# Patient Record
Sex: FEMALE | Race: WHITE | Employment: FULL TIME | URBAN - METROPOLITAN AREA
[De-identification: names, ages, dates, MRNs, and addresses within clinical notes are randomized per-mention and may not be internally consistent; named-entity substitution may affect disease eponyms.]

---

## 2017-01-23 ENCOUNTER — OFFICE VISIT (OUTPATIENT)
Dept: FAMILY MEDICINE CLINIC | Facility: CLINIC | Age: 52
End: 2017-01-23

## 2017-01-23 VITALS
DIASTOLIC BLOOD PRESSURE: 82 MMHG | SYSTOLIC BLOOD PRESSURE: 122 MMHG | BODY MASS INDEX: 37 KG/M2 | TEMPERATURE: 98 F | WEIGHT: 196 LBS | RESPIRATION RATE: 16 BRPM | HEART RATE: 72 BPM | HEIGHT: 61 IN

## 2017-01-23 DIAGNOSIS — B37.3 VAGINAL YEAST INFECTION: ICD-10-CM

## 2017-01-23 DIAGNOSIS — H66.93 ACUTE BILATERAL OTITIS MEDIA: Primary | ICD-10-CM

## 2017-01-23 DIAGNOSIS — B96.89 ACUTE BACTERIAL PHARYNGITIS: ICD-10-CM

## 2017-01-23 DIAGNOSIS — J01.00 ACUTE MAXILLARY SINUSITIS, RECURRENCE NOT SPECIFIED: ICD-10-CM

## 2017-01-23 DIAGNOSIS — J45.20 CHRONIC ASTHMA, MILD INTERMITTENT, UNCOMPLICATED: ICD-10-CM

## 2017-01-23 DIAGNOSIS — J02.8 ACUTE BACTERIAL PHARYNGITIS: ICD-10-CM

## 2017-01-23 PROCEDURE — 99213 OFFICE O/P EST LOW 20 MIN: CPT | Performed by: FAMILY MEDICINE

## 2017-01-23 RX ORDER — FLUCONAZOLE 150 MG/1
150 TABLET ORAL ONCE
Qty: 1 TABLET | Refills: 0 | Status: SHIPPED | OUTPATIENT
Start: 2017-01-23 | End: 2017-01-23

## 2017-01-23 RX ORDER — CIPROFLOXACIN 500 MG/1
500 TABLET, FILM COATED ORAL 2 TIMES DAILY
Qty: 20 TABLET | Refills: 0 | Status: SHIPPED | OUTPATIENT
Start: 2017-01-23 | End: 2017-02-02

## 2017-01-23 RX ORDER — BUDESONIDE AND FORMOTEROL FUMARATE DIHYDRATE 160; 4.5 UG/1; UG/1
2 AEROSOL RESPIRATORY (INHALATION) 2 TIMES DAILY
Qty: 6 INHALER | Refills: 0 | COMMUNITY
Start: 2017-01-23 | End: 2017-04-23

## 2017-01-23 NOTE — PATIENT INSTRUCTIONS
Norina Lombard you were seen for acute bilateral ear infections and a sinus and throat infection. Use the ear drops three drops twice a day for ten days. Start the Cipro 500mg twice a day as well for also ten days.  Use the Diflucan if needed for yeast.   See me if drain better. And this helps prevent infection. Ask your doctor about these suggestions:  · Use a humidifier. Clean it often to remove any mold or mildew. · Drink several glasses of water a day.   · Stay away from drying beverages such as alcohol and coffe mucus to check for bacteria. If you have sinusitis that keeps coming back, you may need imaging tests such as X-rays or CAT scans. This will help your doctor check for a structural problem that may be causing the infection.   Treating acute sinusitis  Treat under 25years of age who has a fever. It may cause severe illness or death. Follow-up care  Follow up with your healthcare provider, or as advised, in 2 weeks if all symptoms have not gotten better, or if hearing doesn't go back to normal within 1 month.

## 2017-01-23 NOTE — PROGRESS NOTES
Liz Coffey is here for an acute visit and states she is sick again just like in December--bilateral ear pains and sinus pressure and pain in face --cheeks under eyes; sneezing a lot, coughing; mold allergies as well and raining unseasonably lately; no N/V/D/C s Stress reaction    • Allergic reaction    • Xerosis of skin    • Elevated BP    • Sciatica    • Gluteal pain      left   • Paresthesia    • Vaginal candidiasis    • BV (bacterial vaginosis)    • Cervical polyp    • Lymphadenopathy    • Cellulitis of scalp bruits  LUNGS: clear to auscultation currently with no wheezes or rhonchi   CARDIO: RRR without murmur  GI: good BS's,no masses, HSM or tenderness  EXTREMITIES: no cyanosis, clubbing or edema    ASSESSMENT AND PLAN:   Dai Mackenzie was seen for acute bilateral ear

## 2017-02-10 ENCOUNTER — TELEPHONE (OUTPATIENT)
Dept: FAMILY MEDICINE CLINIC | Facility: CLINIC | Age: 52
End: 2017-02-10

## 2017-02-10 DIAGNOSIS — H65.03 BILATERAL ACUTE SEROUS OTITIS MEDIA, RECURRENCE NOT SPECIFIED: ICD-10-CM

## 2017-02-10 DIAGNOSIS — J03.00 ACUTE NON-RECURRENT STREPTOCOCCAL TONSILLITIS: ICD-10-CM

## 2017-02-10 DIAGNOSIS — B96.89 ACUTE BACTERIAL PHARYNGITIS: ICD-10-CM

## 2017-02-10 DIAGNOSIS — J02.8 ACUTE BACTERIAL PHARYNGITIS: ICD-10-CM

## 2017-02-10 DIAGNOSIS — H66.93 ACUTE BILATERAL OTITIS MEDIA: Primary | ICD-10-CM

## 2017-02-13 RX ORDER — AZITHROMYCIN 250 MG/1
TABLET, FILM COATED ORAL
Qty: 6 TABLET | Refills: 0 | Status: SHIPPED | OUTPATIENT
Start: 2017-02-13 | End: 2017-03-03

## 2017-02-13 NOTE — TELEPHONE ENCOUNTER
Patient finished her Cipro and ear drops and now her symptoms have returned. She is asking for you to refill her antibiotic. Do you want to see patient or will you refill antibiotic and ear drop?

## 2017-02-13 NOTE — TELEPHONE ENCOUNTER
Call pt that her ear drops Neomycin based and her Zpack were re-issued and have her see me in two weeks after finishing the next round. ENT also an option to offer her as well.  Dr. Shayy Carlson

## 2017-02-28 DIAGNOSIS — Z51.81 ENCOUNTER FOR THERAPEUTIC DRUG MONITORING: ICD-10-CM

## 2017-02-28 DIAGNOSIS — J45.20 CHRONIC ASTHMA, MILD INTERMITTENT, UNCOMPLICATED: Primary | ICD-10-CM

## 2017-02-28 DIAGNOSIS — F32.9 REACTIVE DEPRESSION: ICD-10-CM

## 2017-03-01 RX ORDER — SERTRALINE HYDROCHLORIDE 100 MG/1
TABLET, FILM COATED ORAL
Qty: 30 TABLET | Refills: 0 | Status: SHIPPED | OUTPATIENT
Start: 2017-03-01 | End: 2017-04-03

## 2017-03-01 RX ORDER — MONTELUKAST SODIUM 10 MG/1
TABLET ORAL
Qty: 30 TABLET | Refills: 0 | Status: SHIPPED | OUTPATIENT
Start: 2017-03-01 | End: 2017-04-03

## 2017-03-01 RX ORDER — LEVOTHYROXINE SODIUM 0.07 MG/1
TABLET ORAL
Qty: 90 TABLET | Refills: 0 | OUTPATIENT
Start: 2017-03-01

## 2017-03-01 NOTE — TELEPHONE ENCOUNTER
Patient is requesting refills on Montelukast 10 mg # 90 and Sertraline 100 mg # 80  LOV for DX of medication refill - 4/4/16  No future appointments.

## 2017-03-03 ENCOUNTER — OFFICE VISIT (OUTPATIENT)
Dept: FAMILY MEDICINE CLINIC | Facility: CLINIC | Age: 52
End: 2017-03-03

## 2017-03-03 VITALS
OXYGEN SATURATION: 99 % | HEIGHT: 61 IN | SYSTOLIC BLOOD PRESSURE: 118 MMHG | WEIGHT: 198 LBS | BODY MASS INDEX: 37.38 KG/M2 | HEART RATE: 70 BPM | DIASTOLIC BLOOD PRESSURE: 68 MMHG | RESPIRATION RATE: 12 BRPM

## 2017-03-03 DIAGNOSIS — E03.9 ACQUIRED HYPOTHYROIDISM: Primary | ICD-10-CM

## 2017-03-03 DIAGNOSIS — J45.20 CHRONIC ASTHMA, MILD INTERMITTENT, UNCOMPLICATED: ICD-10-CM

## 2017-03-03 PROCEDURE — 99214 OFFICE O/P EST MOD 30 MIN: CPT | Performed by: FAMILY MEDICINE

## 2017-03-04 NOTE — PROGRESS NOTES
HPI:    Patient ID: Tee Vasquez is a 46year old female. HPI  Patient is here to get form filled out so she can attend a function of [employer] for her daughter. They require medical clearance.   She has history of asthma which is well con murmur, gallop, or rub  Respiratory:  Bilaterally clear to auscultation, no chest tenderness to palpation, no wheezing, no rhonchi, no rales, air entry is adequate, no accessory respiratory muscle use, no chest asymmetry, normal excursion.   GI:  bowel soun

## 2017-04-03 DIAGNOSIS — Z51.81 ENCOUNTER FOR THERAPEUTIC DRUG MONITORING: Primary | ICD-10-CM

## 2017-04-03 DIAGNOSIS — F32.9 REACTIVE DEPRESSION: ICD-10-CM

## 2017-04-05 RX ORDER — MONTELUKAST SODIUM 10 MG/1
TABLET ORAL
Qty: 30 TABLET | Refills: 2 | Status: SHIPPED | OUTPATIENT
Start: 2017-04-05 | End: 2017-07-14

## 2017-04-05 RX ORDER — SERTRALINE HYDROCHLORIDE 100 MG/1
TABLET, FILM COATED ORAL
Qty: 30 TABLET | Refills: 0 | Status: SHIPPED | OUTPATIENT
Start: 2017-04-05 | End: 2017-05-08

## 2017-04-05 NOTE — TELEPHONE ENCOUNTER
Requesting SERTRALINE HCL 100MG TAB  LOV: 3/3/2017 w/   Filled: 3/1/2017 #30 with 0 refills    No future appointments.

## 2017-04-09 DIAGNOSIS — E03.9 ACQUIRED HYPOTHYROIDISM: Primary | ICD-10-CM

## 2017-04-09 DIAGNOSIS — Z76.0 MEDICATION REFILL: ICD-10-CM

## 2017-04-10 RX ORDER — LEVOTHYROXINE SODIUM 0.07 MG/1
TABLET ORAL
Qty: 90 TABLET | Refills: 1 | Status: SHIPPED | OUTPATIENT
Start: 2017-04-10 | End: 2017-11-01

## 2017-04-10 NOTE — TELEPHONE ENCOUNTER
RequestingLEVOTHYROXIN 75MCG TAB   LOV: 3/3/2017  Filled: 11/10/16#90 with 0 refills    No future appointments.     Thyroid Supplements Protocol Failed4/9 3:13 PM   TSH test in past 12 months    TSH value between 0.350 and 5.500 IU/ml     Medication pended

## 2017-05-08 DIAGNOSIS — F32.9 REACTIVE DEPRESSION: ICD-10-CM

## 2017-05-08 DIAGNOSIS — Z51.81 ENCOUNTER FOR THERAPEUTIC DRUG MONITORING: Primary | ICD-10-CM

## 2017-05-08 RX ORDER — SERTRALINE HYDROCHLORIDE 100 MG/1
TABLET, FILM COATED ORAL
Qty: 90 TABLET | Refills: 1 | Status: SHIPPED | OUTPATIENT
Start: 2017-05-08 | End: 2017-11-01

## 2017-05-08 NOTE — TELEPHONE ENCOUNTER
Requesting SERTRALINE 100MG TAB  LOV: 3/3/2017  Filled: 4/5/2017#30 with 0 refills    No future appointments. Medication pended if okay to refill.

## 2017-07-14 DIAGNOSIS — Z51.81 ENCOUNTER FOR THERAPEUTIC DRUG MONITORING: ICD-10-CM

## 2017-07-14 DIAGNOSIS — F32.9 REACTIVE DEPRESSION: ICD-10-CM

## 2017-07-17 RX ORDER — MONTELUKAST SODIUM 10 MG/1
TABLET ORAL
Qty: 30 TABLET | Refills: 2 | Status: SHIPPED | OUTPATIENT
Start: 2017-07-17 | End: 2017-11-01

## 2017-07-17 NOTE — TELEPHONE ENCOUNTER
MONTELUKAST 10MG TAB  In chart as: MONTELUKAST SODIUM 10 MG Oral Tab  TAKE ONE TABLET BY MOUTH ONCE DAILY IN THE EVENING       Disp: 30 tablet Refills: 0    Class: Normal Start: 7/14/2017   For: Encounter for therapeutic drug monitoring, Reactive depressio

## 2017-10-28 DIAGNOSIS — Z76.0 MEDICATION REFILL: ICD-10-CM

## 2017-10-28 DIAGNOSIS — F32.9 REACTIVE DEPRESSION: ICD-10-CM

## 2017-10-28 DIAGNOSIS — E03.9 ACQUIRED HYPOTHYROIDISM: ICD-10-CM

## 2017-10-28 DIAGNOSIS — Z51.81 ENCOUNTER FOR THERAPEUTIC DRUG MONITORING: ICD-10-CM

## 2017-10-28 RX ORDER — MONTELUKAST SODIUM 10 MG/1
TABLET ORAL
Qty: 30 TABLET | Refills: 2 | Status: CANCELLED | OUTPATIENT
Start: 2017-10-28

## 2017-10-28 RX ORDER — LEVOTHYROXINE SODIUM 0.07 MG/1
TABLET ORAL
Qty: 90 TABLET | Refills: 1 | Status: CANCELLED | OUTPATIENT
Start: 2017-10-28

## 2017-10-30 NOTE — TELEPHONE ENCOUNTER
Future Appointments  Date Time Provider Staci Ellis   11/1/2017 2:00 PM Lee Mejia,  [facility] [address] [facility] [address]

## 2017-10-30 NOTE — TELEPHONE ENCOUNTER
Requested Medications   MONTELUKAST 10MG TAB  Will file in chart as: MONTELUKAST SODIUM 10 MG Oral Tab  TAKE ONE TABLET BY MOUTH ONCE DAILY IN THE EVENING       Disp: 30 tablet Refills: 2    Class: Normal Start: 10/28/2017   For: Encounter for therapeutic

## 2017-11-01 ENCOUNTER — OFFICE VISIT (OUTPATIENT)
Dept: FAMILY MEDICINE CLINIC | Facility: CLINIC | Age: 52
End: 2017-11-01

## 2017-11-01 VITALS
BODY MASS INDEX: 36.25 KG/M2 | HEIGHT: 61 IN | DIASTOLIC BLOOD PRESSURE: 76 MMHG | RESPIRATION RATE: 16 BRPM | WEIGHT: 192 LBS | HEART RATE: 82 BPM | OXYGEN SATURATION: 99 % | TEMPERATURE: 99 F | SYSTOLIC BLOOD PRESSURE: 122 MMHG

## 2017-11-01 DIAGNOSIS — B37.3 VAGINAL CANDIDIASIS: ICD-10-CM

## 2017-11-01 DIAGNOSIS — J45.20 MILD INTERMITTENT CHRONIC ASTHMA WITHOUT COMPLICATION: ICD-10-CM

## 2017-11-01 DIAGNOSIS — Z51.81 ENCOUNTER FOR THERAPEUTIC DRUG MONITORING: ICD-10-CM

## 2017-11-01 DIAGNOSIS — F32.9 REACTIVE DEPRESSION: ICD-10-CM

## 2017-11-01 DIAGNOSIS — E03.9 ACQUIRED HYPOTHYROIDISM: Primary | ICD-10-CM

## 2017-11-01 PROCEDURE — 99214 OFFICE O/P EST MOD 30 MIN: CPT | Performed by: FAMILY MEDICINE

## 2017-11-01 RX ORDER — MONTELUKAST SODIUM 10 MG/1
TABLET ORAL
Qty: 90 TABLET | Refills: 1 | Status: SHIPPED | OUTPATIENT
Start: 2017-11-01 | End: 2018-06-22

## 2017-11-01 RX ORDER — LEVOTHYROXINE SODIUM 0.07 MG/1
TABLET ORAL
Qty: 90 TABLET | Refills: 1 | Status: SHIPPED | OUTPATIENT
Start: 2017-11-01 | End: 2018-06-22

## 2017-11-01 RX ORDER — BUDESONIDE AND FORMOTEROL FUMARATE DIHYDRATE 160; 4.5 UG/1; UG/1
AEROSOL RESPIRATORY (INHALATION)
COMMUNITY
Start: 2017-09-07 | End: 2018-01-23

## 2017-11-01 RX ORDER — SERTRALINE HYDROCHLORIDE 100 MG/1
TABLET, FILM COATED ORAL
Qty: 90 TABLET | Refills: 1 | Status: SHIPPED | OUTPATIENT
Start: 2017-11-01 | End: 2018-06-22

## 2017-11-01 RX ORDER — FLUCONAZOLE 150 MG/1
150 TABLET ORAL ONCE
Qty: 1 TABLET | Refills: 0 | Status: SHIPPED | OUTPATIENT
Start: 2017-11-01 | End: 2017-11-01

## 2017-11-01 NOTE — PATIENT INSTRUCTIONS
Controlling Your Asthma  You can do a lot to manage your asthma and improve your quality of life. You will need to work with your healthcare provider to develop a plan. But it’s up to you to put this plan into action.   Why you need to take control  You n You have hypothyroidism. This means your thyroid gland is not making enough thyroid hormone. This hormone is vital to body growth and metabolism. If you don’t make enough, many body processes slow down. This can cause symptoms throughout the body.  Hypothyr · Tell your provider if you have any side effects from your medicines that bother you.   · Never change the dosage or stop taking your thyroid pills without talking to your provider first.  General care  · Always talk with your provider before trying other · Manage early symptoms. If you notice symptoms returning, experience triggers, or identify other factors that may lead to a depressive episode, get help as soon as possible.  Ask trusted friends and family to monitor your behavior and let you know if they · Get relief from stress. Ask your healthcare provider for relaxation exercises and techniques to help relieve stress. · Eat right. A balanced and healthy diet helps keep your body healthy.   Date Last Reviewed: 1/1/2017  © 5903-8190 [employer],

## 2017-11-01 NOTE — PROGRESS NOTES
Katya Vargas is a 46year old female. HPI:   Pt here for hypothyroidism, mild intermittent controlled asthma, depression, and medication management. Mom  day before son's wedding recently and stressed per pt.   Also struggling with dad who has Artem Heads • Depression    • Dermatitis    • Disc herniation     Left L5-S1   • Dyslipidemia    • Dyslipidemia    • Elevated BP    • Globus sensation    • Gluteal pain     left   • Hand, foot and mouth disease    • Hypercholesterolemia    • Hypocalcemia    • Hypoth suspicious lesions  HEENT: atraumatic, normocephalic,ears and throat are clear  NECK: supple,no adenopathy,no bruits  LUNGS: clear to auscultation  CARDIO: RRR without murmur  GI: good BS's,no masses, HSM or tenderness  EXTREMITIES: no cyanosis, clubbing o Oral Tab; TAKE ONE TABLET BY MOUTH ONCE DAILY IN THE EVENING  Pt and I discussed asthma action plan and she knows how to use medications/ inhalers for management and when to use 911 if needed. ACT score reviewed as well.     Pt to remain on Symbicort as wel

## 2017-11-02 ENCOUNTER — TELEPHONE (OUTPATIENT)
Dept: FAMILY MEDICINE CLINIC | Facility: CLINIC | Age: 52
End: 2017-11-02

## 2017-11-02 NOTE — PROGRESS NOTES
Pt seen yesterday and please call pt with normal results of thyroid studies and repeat as directed again in 6 mos and remain on current meds--  Dr. Carlos Sacha

## 2017-11-03 DIAGNOSIS — E03.9 ACQUIRED HYPOTHYROIDISM: Primary | ICD-10-CM

## 2018-01-10 DIAGNOSIS — J45.20 CHRONIC ASTHMA, MILD INTERMITTENT, UNCOMPLICATED: ICD-10-CM

## 2018-01-10 DIAGNOSIS — Z76.0 MEDICATION REFILL: Primary | ICD-10-CM

## 2018-01-10 RX ORDER — BUDESONIDE AND FORMOTEROL FUMARATE DIHYDRATE 160; 4.5 UG/1; UG/1
AEROSOL RESPIRATORY (INHALATION)
Qty: 3 INHALER | Refills: 3 | Status: SHIPPED | OUTPATIENT
Start: 2018-01-10 | End: 2019-01-05

## 2018-01-10 NOTE — TELEPHONE ENCOUNTER
SYMBICORT 160-4.5 AER  Will file in chart as: SYMBICORT 160-4.5 MCG/ACT Inhalation Aerosol  INHALE TWO PUFFS BY MOUTH TWICE DAILY       Disp: Not specified (Pharmacy requested 11 Undefined)   Refills: 3     Class: Normal Start: 1/10/2018   For: Chronic ast

## 2018-01-23 ENCOUNTER — OFFICE VISIT (OUTPATIENT)
Dept: FAMILY MEDICINE CLINIC | Facility: CLINIC | Age: 53
End: 2018-01-23

## 2018-01-23 ENCOUNTER — TELEPHONE (OUTPATIENT)
Dept: FAMILY MEDICINE CLINIC | Facility: CLINIC | Age: 53
End: 2018-01-23

## 2018-01-23 VITALS
HEART RATE: 85 BPM | SYSTOLIC BLOOD PRESSURE: 164 MMHG | OXYGEN SATURATION: 97 % | HEIGHT: 61 IN | BODY MASS INDEX: 36.46 KG/M2 | TEMPERATURE: 99 F | RESPIRATION RATE: 18 BRPM | DIASTOLIC BLOOD PRESSURE: 90 MMHG | WEIGHT: 193.13 LBS

## 2018-01-23 DIAGNOSIS — R03.0 ELEVATED BLOOD PRESSURE READING: ICD-10-CM

## 2018-01-23 DIAGNOSIS — R68.89 FLU-LIKE SYMPTOMS: Primary | ICD-10-CM

## 2018-01-23 PROCEDURE — 99214 OFFICE O/P EST MOD 30 MIN: CPT | Performed by: FAMILY MEDICINE

## 2018-01-23 PROCEDURE — 87798 DETECT AGENT NOS DNA AMP: CPT | Performed by: FAMILY MEDICINE

## 2018-01-23 PROCEDURE — 87502 INFLUENZA DNA AMP PROBE: CPT | Performed by: FAMILY MEDICINE

## 2018-01-23 RX ORDER — AZITHROMYCIN 250 MG/1
TABLET, FILM COATED ORAL
Qty: 6 TABLET | Refills: 0 | Status: SHIPPED | OUTPATIENT
Start: 2018-01-23 | End: 2018-06-22

## 2018-01-23 RX ORDER — PREDNISONE 20 MG/1
20 TABLET ORAL DAILY
Qty: 5 TABLET | Refills: 0 | Status: SHIPPED | OUTPATIENT
Start: 2018-01-23 | End: 2018-01-28

## 2018-01-23 NOTE — TELEPHONE ENCOUNTER
Patient felt like she had the flu on Saturday - stayed in bed all weekend, hydrated. She felt a little better, but now  she now has SOB - with asthma, temp of 100.   I advised we would need to check her breathing  Scheduled appointment for today to be [facility]

## 2018-01-23 NOTE — PATIENT INSTRUCTIONS
Flu swab pending. Continue supportive care with tylenol and mucinex. Also continue symbicort daily and albuterol as needed. Stop any medications with decongestant (sudafed or phenylephrine) due to elevated blood pressure.   If negative, can start Dorothe Giovanni (a coughing, sore throat, and congestion in your nose and sinuses. Don’t use a decongestant if you have high blood pressure. · Stay home until your fever has been gone for at least 24 hours without using medicine to reduce fever.   Follow-up care  Follow up w

## 2018-01-23 NOTE — TELEPHONE ENCOUNTER
Patient states that on Saturday she had bodyaches, fatigue, and fever. Patient states subsided and then today came back with fever, body aches, and heaviness in chest-pt is asthmatic.

## 2018-01-23 NOTE — PROGRESS NOTES
HPI:   Tereza Yeh is a 46year old female that presents for cough, sinus congestion, fever and body aches for 3 days. She notes she has been very fatigued and achy with fever since Saturday.   She has a history of moderate persistent asthma usually on Rfl:     REVIEW OF SYSTEMS:     Comprehensive ROS negative unless noted in HPI    PHYSICAL EXAM:   BP (!) 164/90   Pulse 85   Temp 99.4 °F (37.4 °C) (Temporal)   Resp 18   Ht 61\"   Wt 193 lb 2 oz   SpO2 97%   BMI 36.49 kg/m²  Estimated body mass index is medications consistently for a few days. Likely due to decongestant use. Recommend patient follow-up in 1 week for annual physical and blood pressure check    Risks, benefits, and alternatives of current treatment plan discussed in detail.   Questions and

## 2018-01-24 ENCOUNTER — TELEPHONE (OUTPATIENT)
Dept: FAMILY MEDICINE CLINIC | Facility: CLINIC | Age: 53
End: 2018-01-24

## 2018-01-24 NOTE — TELEPHONE ENCOUNTER
Patient states that she will call back on Monday to provide us with her work fax number.     Letter placed in brown folder in triage

## 2018-01-24 NOTE — TELEPHONE ENCOUNTER
Dr. Melody Lay did a letter for patient to be off work. It was not given at office visit? I left message for patient to verify if we are faxing this for her?

## 2018-02-12 ENCOUNTER — PRIOR ORIGINAL RECORDS (OUTPATIENT)
Dept: OTHER | Age: 53
End: 2018-02-12

## 2018-02-20 ENCOUNTER — MYAURORA ACCOUNT LINK (OUTPATIENT)
Dept: OTHER | Age: 53
End: 2018-02-20

## 2018-02-20 ENCOUNTER — HOSPITAL ENCOUNTER (OUTPATIENT)
Dept: CV DIAGNOSTICS | Age: 53
Discharge: HOME | End: 2018-02-20
Attending: INTERNAL MEDICINE
Payer: COMMERCIAL

## 2018-02-20 DIAGNOSIS — R00.2 PALPITATIONS: ICD-10-CM

## 2018-02-20 DIAGNOSIS — I34.1 MITRAL VALVE PROLAPSE: ICD-10-CM

## 2018-02-21 ENCOUNTER — PRIOR ORIGINAL RECORDS (OUTPATIENT)
Dept: OTHER | Age: 53
End: 2018-02-21

## 2018-03-05 ENCOUNTER — MYAURORA ACCOUNT LINK (OUTPATIENT)
Dept: OTHER | Age: 53
End: 2018-03-05

## 2018-03-05 ENCOUNTER — PRIOR ORIGINAL RECORDS (OUTPATIENT)
Dept: OTHER | Age: 53
End: 2018-03-05

## 2018-03-05 ENCOUNTER — MED REC SCAN ONLY (OUTPATIENT)
Dept: FAMILY MEDICINE CLINIC | Facility: CLINIC | Age: 53
End: 2018-03-05

## 2018-03-06 ENCOUNTER — PRIOR ORIGINAL RECORDS (OUTPATIENT)
Dept: OTHER | Age: 53
End: 2018-03-06

## 2018-03-20 ENCOUNTER — PRIOR ORIGINAL RECORDS (OUTPATIENT)
Dept: OTHER | Age: 53
End: 2018-03-20

## 2018-06-22 ENCOUNTER — OFFICE VISIT (OUTPATIENT)
Dept: FAMILY MEDICINE CLINIC | Facility: CLINIC | Age: 53
End: 2018-06-22

## 2018-06-22 VITALS
DIASTOLIC BLOOD PRESSURE: 82 MMHG | SYSTOLIC BLOOD PRESSURE: 130 MMHG | WEIGHT: 201 LBS | RESPIRATION RATE: 16 BRPM | BODY MASS INDEX: 37.95 KG/M2 | HEIGHT: 61 IN | HEART RATE: 78 BPM

## 2018-06-22 DIAGNOSIS — Z51.81 ENCOUNTER FOR THERAPEUTIC DRUG MONITORING: Primary | ICD-10-CM

## 2018-06-22 DIAGNOSIS — J45.20 MILD INTERMITTENT CHRONIC ASTHMA WITHOUT COMPLICATION: ICD-10-CM

## 2018-06-22 DIAGNOSIS — H02.403 PTOSIS OF BOTH EYELIDS: ICD-10-CM

## 2018-06-22 DIAGNOSIS — R53.83 FATIGUE, UNSPECIFIED TYPE: ICD-10-CM

## 2018-06-22 DIAGNOSIS — E03.9 ACQUIRED HYPOTHYROIDISM: ICD-10-CM

## 2018-06-22 DIAGNOSIS — E66.09 CLASS 2 OBESITY DUE TO EXCESS CALORIES WITHOUT SERIOUS COMORBIDITY WITH BODY MASS INDEX (BMI) OF 37.0 TO 37.9 IN ADULT: ICD-10-CM

## 2018-06-22 DIAGNOSIS — Z01.89 ROUTINE LAB DRAW: ICD-10-CM

## 2018-06-22 DIAGNOSIS — Z30.432 ENCOUNTER FOR IUD REMOVAL: ICD-10-CM

## 2018-06-22 DIAGNOSIS — F32.9 REACTIVE DEPRESSION: ICD-10-CM

## 2018-06-22 PROCEDURE — 99214 OFFICE O/P EST MOD 30 MIN: CPT | Performed by: PHYSICIAN ASSISTANT

## 2018-06-22 RX ORDER — SERTRALINE HYDROCHLORIDE 100 MG/1
TABLET, FILM COATED ORAL
Qty: 90 TABLET | Refills: 1 | Status: SHIPPED | OUTPATIENT
Start: 2018-06-22 | End: 2019-01-15

## 2018-06-22 RX ORDER — MONTELUKAST SODIUM 10 MG/1
TABLET ORAL
Qty: 90 TABLET | Refills: 1 | Status: SHIPPED | OUTPATIENT
Start: 2018-06-22 | End: 2019-01-15

## 2018-06-22 RX ORDER — LEVOTHYROXINE SODIUM 0.07 MG/1
TABLET ORAL
Qty: 90 TABLET | Refills: 1 | Status: SHIPPED | OUTPATIENT
Start: 2018-06-22 | End: 2019-01-15

## 2018-06-22 RX ORDER — BUPROPION HYDROCHLORIDE 150 MG/1
150 TABLET ORAL DAILY
Qty: 30 TABLET | Refills: 0 | Status: SHIPPED | OUTPATIENT
Start: 2018-06-22 | End: 2019-01-15

## 2018-06-22 NOTE — PROGRESS NOTES
[facility] Internal Medicine Progress Note    CC:  Patient presents with:  Medication Request  Referral: natty ferrari IUD removed      HPI:   HPI  Hypothyroidism  Pt is doing well on levothyroxine  She denies any hair loss  She does complain of w and headaches. Psychiatric/Behavioral: Positive for dysphoric mood. Negative for self-injury, sleep disturbance and suicidal ideas. The patient is not nervous/anxious.           /82   Pulse 78   Resp 16   Ht 61\"   Wt 201 lb   BMI 37.98 kg/m²  Body labs  Will check sleep study    Routine lab draw    RTC in 1 month, sooner if problems    Orders Placed This Encounter      CBC      CMP      Lipid Panel      TSH [899] [Q]      T4 FREE [866] [Q]      HGB A1C [496] [Q]      VITAMIN D, 25-HYDROXY [46758][Q]

## 2018-06-22 NOTE — PATIENT INSTRUCTIONS
Thank you for choosing Farhan Arenas PA-C at [place] [address]  To Do: Aixa Grace  1. Pt to get lab work done  2. Download myfitnesspal  3. Start wellbutrin  4.  Pt to follow-up in 1 month  Your weight:  Wt Readings from Last 6 Encounters:  06/2 plate with fruits, vegetables, nuts, whole grains, and meat of every kind except processed meat like salami, bradford, bologna. Low calorie is less than 1773-9702 calories a day.   Patients need to follow a diet that fits with their lifestyle, you have to eat focus on good carbs  BAD carbs have added sugar such as  - soft drinks   - sport drinks   - fruit drinks   - beer   - french fries   - white rice   - sugar-sweetened cereals   - bagels   - baguettes   - croissants   - potato chips   - cookies   - white cra know that our intention is that the benefits outweigh those potential risks and we strive to make you healthier and to improve your quality of life. Referrals, Radiology, and Lab Testing:     If your insurance requires a referral to a specialist, please

## 2018-11-23 ENCOUNTER — OFFICE VISIT (OUTPATIENT)
Dept: FAMILY MEDICINE CLINIC | Facility: CLINIC | Age: 53
End: 2018-11-23
Payer: COMMERCIAL

## 2018-11-23 VITALS
WEIGHT: 200 LBS | OXYGEN SATURATION: 98 % | DIASTOLIC BLOOD PRESSURE: 80 MMHG | TEMPERATURE: 99 F | HEIGHT: 61 IN | BODY MASS INDEX: 37.76 KG/M2 | RESPIRATION RATE: 18 BRPM | SYSTOLIC BLOOD PRESSURE: 122 MMHG | HEART RATE: 81 BPM

## 2018-11-23 DIAGNOSIS — J01.00 ACUTE NON-RECURRENT MAXILLARY SINUSITIS: Primary | ICD-10-CM

## 2018-11-23 PROCEDURE — 99213 OFFICE O/P EST LOW 20 MIN: CPT | Performed by: NURSE PRACTITIONER

## 2018-11-23 RX ORDER — DOXYCYCLINE HYCLATE 100 MG/1
100 CAPSULE ORAL 2 TIMES DAILY
Qty: 14 CAPSULE | Refills: 0 | Status: SHIPPED | OUTPATIENT
Start: 2018-11-23 | End: 2018-11-30

## 2018-11-23 NOTE — PROGRESS NOTES
CHIEF COMPLAINT:   Patient presents with:  Sinus Problem: s/s for 10 days  Ear Pain: right ear  Cough: wet. OTC meds taken      HPI:   Noah Flower is a 48year old female who presents for sinus congestion for  10  days.  Symptoms have been worsening • Cervical polyp    • Chronic asthma    • Contact dermatitis    • Cough     acute   • DDD (degenerative disc disease), lumbar    • Depression    • Dermatitis    • Disc herniation     Left L5-S1   • Dyslipidemia    • Dyslipidemia    • Elevated BP    • Globu /80   Pulse 81   Temp 98.6 °F (37 °C) (Oral)   Resp 18   Ht 61\"   Wt 200 lb   SpO2 98%   Breastfeeding?  No   BMI 37.79 kg/m²   GENERAL: well developed, well nourished,in no apparent distress  SKIN: no rashes,no suspicious lesions  HEAD: atraumatic, To make sure you're using it properly-- look at the floor, insert the nozzle into the nasal opening and aim the spray toward the ceiling. Use a gentle sniff when you spray the flonase into the nostril.  Avoid using a big \"sniff\" which will send the spr · You can use an over-the-counter decongestant, unless a similar medicine was prescribed to you. Nasal sprays work the fastest. Use one that contains phenylephrine or oxymetazoline. First blow your nose gently. Then use the spray.  Do not use these medicine · Don’t have close contact with people who have sore throats, colds, or other upper respiratory infections. · Don’t smoke, and stay away from secondhand smoke. · Stay up to date with of your vaccines.   Date Last Reviewed: 11/1/2017  © 4838-6795 [employer]

## 2018-11-23 NOTE — PATIENT INSTRUCTIONS
-continue mucinex  -can take allergy med vs sudafed  -stay well hydrated and rest  -follow up if you develop a fever, worsening in symptoms or no improvement in 3-4 days    -Use flonase-- 2 sprays each nostril at bedtime.   To make sure you're using it prop · You can use an over-the-counter decongestant, unless a similar medicine was prescribed to you. Nasal sprays work the fastest. Use one that contains phenylephrine or oxymetazoline. First blow your nose gently. Then use the spray.  Do not use these medicine · Don’t have close contact with people who have sore throats, colds, or other upper respiratory infections. · Don’t smoke, and stay away from secondhand smoke. · Stay up to date with of your vaccines.   Date Last Reviewed: 11/1/2017  © 1859-2738 [employer]

## 2018-12-01 ENCOUNTER — APPOINTMENT (OUTPATIENT)
Dept: GENERAL RADIOLOGY | Age: 53
End: 2018-12-01
Attending: PHYSICIAN ASSISTANT
Payer: COMMERCIAL

## 2018-12-01 ENCOUNTER — HOSPITAL ENCOUNTER (OUTPATIENT)
Age: 53
Discharge: HOME | End: 2018-12-01
Payer: COMMERCIAL

## 2018-12-01 VITALS
OXYGEN SATURATION: 96 % | DIASTOLIC BLOOD PRESSURE: 76 MMHG | TEMPERATURE: 98 F | HEART RATE: 77 BPM | RESPIRATION RATE: 16 BRPM | SYSTOLIC BLOOD PRESSURE: 144 MMHG

## 2018-12-01 DIAGNOSIS — H65.01 RIGHT ACUTE SEROUS OTITIS MEDIA, RECURRENCE NOT SPECIFIED: ICD-10-CM

## 2018-12-01 DIAGNOSIS — J40 BRONCHITIS: Primary | ICD-10-CM

## 2018-12-01 PROCEDURE — 99213 OFFICE O/P EST LOW 20 MIN: CPT

## 2018-12-01 PROCEDURE — 99214 OFFICE O/P EST MOD 30 MIN: CPT

## 2018-12-01 PROCEDURE — 71046 X-RAY EXAM CHEST 2 VIEWS: CPT | Performed by: PHYSICIAN ASSISTANT

## 2018-12-01 RX ORDER — BENZONATATE 100 MG/1
CAPSULE ORAL 3 TIMES DAILY PRN
Qty: 30 CAPSULE | Refills: 0 | Status: SHIPPED | OUTPATIENT
Start: 2018-12-01 | End: 2018-12-31

## 2018-12-01 RX ORDER — PREDNISONE 20 MG/1
40 TABLET ORAL DAILY
Qty: 10 TABLET | Refills: 0 | Status: SHIPPED | OUTPATIENT
Start: 2018-12-01 | End: 2018-12-06

## 2018-12-01 NOTE — ED PROVIDER NOTES
Patient Seen in: [address] [facility]    History   Patient presents with:  Cough/URI    Stated Complaint: Cough, Ear pain R ear x 3 weeks, Fatigue    HPI    CHIEF COMPLAINT: Cough for the past 3 weeks, right ear pressure     HISTORY OF [place] Depression    • Dermatitis    • Disc herniation     Left L5-S1   • Dyslipidemia    • Dyslipidemia    • Elevated BP    • Globus sensation    • Gluteal pain     left   • Hand, foot and mouth disease    • Hypercholesterolemia    • Hypocalcemia    • Hypothyroi or injection, no sclera icterus  Ears: Bilateral canals clear. Left TM clear. Right TM with serous otitis. No injection or erythema is noted. Throat: There is no erythema or exudates, no tonsillar hypertrophy. no trismus or stridor. Uvula midline.  No comes in with complaints of ongoing cough for the past 3 weeks. Patient did complete a round of antibiotics without any improvement. She is not worsening, but is concerned because she has continued cough.   Chest x-ray was performed today and was negative

## 2018-12-01 NOTE — ED INITIAL ASSESSMENT (HPI)
C/O cough and congestion that started 3 weeks ago. Sts that she has been seen for this before and given an antibiotic. She is not any better. Cough is productive w/ green phlegm.

## 2019-01-11 DIAGNOSIS — F32.9 REACTIVE DEPRESSION: ICD-10-CM

## 2019-01-11 DIAGNOSIS — Z51.81 ENCOUNTER FOR THERAPEUTIC DRUG MONITORING: ICD-10-CM

## 2019-01-14 RX ORDER — SERTRALINE HYDROCHLORIDE 100 MG/1
TABLET, FILM COATED ORAL
Qty: 90 TABLET | Refills: 1 | OUTPATIENT
Start: 2019-01-14

## 2019-01-14 NOTE — TELEPHONE ENCOUNTER
Name from pharmacy: SERTRALINE 100MG TAB          Will file in chart as: SERTRALINE  MG Oral Tab    Sig: TAKE 1 TABLET BY MOUTH ONCE DAILY    Disp:  90 tablet    Refills:  1    Start: 1/11/2019    Class: Normal    For: Encounter for therapeutic drug

## 2019-01-15 ENCOUNTER — LAB ENCOUNTER (OUTPATIENT)
Dept: LAB | Age: 54
End: 2019-01-15
Attending: PHYSICIAN ASSISTANT
Payer: COMMERCIAL

## 2019-01-15 ENCOUNTER — OFFICE VISIT (OUTPATIENT)
Dept: FAMILY MEDICINE CLINIC | Facility: CLINIC | Age: 54
End: 2019-01-15
Payer: COMMERCIAL

## 2019-01-15 VITALS
RESPIRATION RATE: 16 BRPM | WEIGHT: 202 LBS | HEIGHT: 61 IN | SYSTOLIC BLOOD PRESSURE: 130 MMHG | DIASTOLIC BLOOD PRESSURE: 78 MMHG | HEART RATE: 76 BPM | BODY MASS INDEX: 38.14 KG/M2

## 2019-01-15 DIAGNOSIS — E66.09 CLASS 2 OBESITY DUE TO EXCESS CALORIES WITHOUT SERIOUS COMORBIDITY WITH BODY MASS INDEX (BMI) OF 38.0 TO 38.9 IN ADULT: ICD-10-CM

## 2019-01-15 DIAGNOSIS — F32.A DEPRESSION, UNSPECIFIED DEPRESSION TYPE: ICD-10-CM

## 2019-01-15 DIAGNOSIS — Z01.89 ROUTINE LAB DRAW: ICD-10-CM

## 2019-01-15 DIAGNOSIS — J45.20 MILD INTERMITTENT CHRONIC ASTHMA WITHOUT COMPLICATION: Primary | ICD-10-CM

## 2019-01-15 DIAGNOSIS — E03.9 HYPOTHYROIDISM, UNSPECIFIED TYPE: ICD-10-CM

## 2019-01-15 DIAGNOSIS — Z12.11 SCREENING FOR COLON CANCER: ICD-10-CM

## 2019-01-15 DIAGNOSIS — M76.62 TENDONITIS, ACHILLES, LEFT: ICD-10-CM

## 2019-01-15 LAB
T4 FREE SERPL-MCNC: 1.2 NG/DL (ref 0.9–1.8)
TSI SER-ACNC: 0.94 MIU/ML (ref 0.35–5.5)

## 2019-01-15 PROCEDURE — 99214 OFFICE O/P EST MOD 30 MIN: CPT | Performed by: PHYSICIAN ASSISTANT

## 2019-01-15 PROCEDURE — 84443 ASSAY THYROID STIM HORMONE: CPT

## 2019-01-15 PROCEDURE — 36415 COLL VENOUS BLD VENIPUNCTURE: CPT

## 2019-01-15 PROCEDURE — 84439 ASSAY OF FREE THYROXINE: CPT

## 2019-01-15 RX ORDER — LEVOTHYROXINE SODIUM 0.07 MG/1
TABLET ORAL
Qty: 90 TABLET | Refills: 1 | Status: SHIPPED | OUTPATIENT
Start: 2019-01-15 | End: 2019-10-09

## 2019-01-15 RX ORDER — MONTELUKAST SODIUM 10 MG/1
TABLET ORAL
Qty: 90 TABLET | Refills: 1 | Status: SHIPPED | OUTPATIENT
Start: 2019-01-15 | End: 2019-10-09

## 2019-01-15 RX ORDER — BUDESONIDE AND FORMOTEROL FUMARATE DIHYDRATE 160; 4.5 UG/1; UG/1
160 AEROSOL RESPIRATORY (INHALATION) DAILY
COMMUNITY
End: 2019-03-20

## 2019-01-15 NOTE — PATIENT INSTRUCTIONS
Thank you for choosing Noemy Villanueva PA-C at [address]  To Do: Katya Vargas  1. Pt to get labs done  2. Continue medications  3. Referral for colonoscopy, WLC, ortho  4.  Follow-up in 6 months, sooner if problems  • Please signup for [facility] that the insurance company approved your testing, please call Central Scheduling at [phone]  Please allow our office 5 business days to contact you regarding any testing results.     Refill policies:   Allow 3 business days for refills; controlled subs

## 2019-01-15 NOTE — PROGRESS NOTES
[facility] Internal Medicine Progress Note    CC:  Patient presents with:  Medication Request      HPI:   HPI  Hypothyroidism  Pt is doing well on levothyroxine  Denies any weight gain, hair loss, brittle nails    Depression  Pt is doing well on for nausea and vomiting. Musculoskeletal: Positive for myalgias. Bump L ankle   Neurological: Negative for dizziness, light-headedness and headaches.    Psychiatric/Behavioral: Negative for dysphoric mood, self-injury, sleep disturbance and suicida that is ok and to just call the office    Class 2 obesity due to excess calories without serious comorbidity with body mass index (bmi) of 38.0 to 38.9 in adult  Pt to continue to work on clean eating  Increase exercise  Referral for [facility]    Screening for c

## 2019-02-28 VITALS
HEART RATE: 70 BPM | DIASTOLIC BLOOD PRESSURE: 70 MMHG | BODY MASS INDEX: 37.19 KG/M2 | HEIGHT: 61 IN | WEIGHT: 197 LBS | SYSTOLIC BLOOD PRESSURE: 126 MMHG

## 2019-02-28 VITALS
HEART RATE: 70 BPM | WEIGHT: 196 LBS | BODY MASS INDEX: 37 KG/M2 | SYSTOLIC BLOOD PRESSURE: 142 MMHG | HEIGHT: 61 IN | DIASTOLIC BLOOD PRESSURE: 80 MMHG

## 2019-03-19 NOTE — TELEPHONE ENCOUNTER
Recd refill request from [facility] in [address] for Symbicort 160-4.5 AER. Unable to escribe due to original prescriber.

## 2019-03-20 ENCOUNTER — TELEPHONE (OUTPATIENT)
Dept: FAMILY MEDICINE CLINIC | Facility: CLINIC | Age: 54
End: 2019-03-20

## 2019-03-20 RX ORDER — BUDESONIDE AND FORMOTEROL FUMARATE DIHYDRATE 160; 4.5 UG/1; UG/1
2 AEROSOL RESPIRATORY (INHALATION) DAILY
Qty: 3 INHALER | Refills: 1 | Status: SHIPPED | OUTPATIENT
Start: 2019-03-20 | End: 2021-01-29

## 2019-03-20 NOTE — TELEPHONE ENCOUNTER
[address], [address] [facility] [phone], [phone]   Medication Detail      Disp Refills Start End    Budesonide-Formoterol Fumarate (SYMBICORT) 160-4.5 MCG/ACT Inhalation Aerosol 3 Inhaler 1 3/20/2019     Sig - Rout

## 2019-03-20 NOTE — TELEPHONE ENCOUNTER
Asthma & COPD Medication Protocol Passed  Requesting SYMBICORT 160-4.5 MCG/ACT Inhalation Aerosol  LOV: 1/15/19  RTC: 6 mos  Last Labs: AAP 1/15/19  Filled: 1/10/18#3 with 3 refills    No future appointments.

## 2019-03-20 NOTE — TELEPHONE ENCOUNTER
[facility] calling to clarify Symbicort directions. Patient has been on Symbicort 2 puffs twice daily and it was sent for 2 puffs once daily. [facility] pharmacy was informed to keep same directions as before.

## 2019-05-03 ENCOUNTER — TELEPHONE (OUTPATIENT)
Dept: FAMILY MEDICINE CLINIC | Facility: CLINIC | Age: 54
End: 2019-05-03

## 2019-05-03 NOTE — TELEPHONE ENCOUNTER
Patient overdue for annual physical and several preventative screenings. Please contact to schedule.      Thierry Chakraborty, DO  Family Medicine

## 2019-06-13 NOTE — PATIENT INSTRUCTIONS
Thank you for choosing Nishi Evans PA-C at [place] [address]  To Do: Tee Vasquez  1. Pt to discontinue zoloft, and begin prozac, if after 2 weeks symptoms not doing better, can increase to 2 tabs daily. 2. Xanax as needed  3.  Referral for col up to 10 business days to receive approval from your insurance company.      Once our office has called informing you that the insurance company approved your testing, please call Central Scheduling at [phone]  Please allow our office 5 business days t

## 2019-06-13 NOTE — PROGRESS NOTES
[facility] Internal Medicine Progress Note    CC:  Patient presents with: Anxiety: c/o having increase in anxiety, here to discuss changing the zoloft  & possibly adding xanax. Lump: left heel that is causing her pain.       HPI:   HPI  Pt is cu Review of Systems   Constitutional: Negative for chills, fatigue and fever. Respiratory: Negative for cough and shortness of breath. Cardiovascular: Negative for chest pain. Gastrointestinal: Negative for nausea and vomiting.    Musculoskeletal: Posi Will also begin xanax as needed  Discussed side effects and adverse effects of medication    Achilles tendinitis of left lower extremity  Referral for ortho    Personal history of colonic polyps  Screening for colon cancer  Referral for gastro    Screening

## 2019-07-11 ENCOUNTER — TELEPHONE (OUTPATIENT)
Dept: FAMILY MEDICINE CLINIC | Facility: CLINIC | Age: 54
End: 2019-07-11

## 2019-07-11 RX ORDER — SERTRALINE HYDROCHLORIDE 100 MG/1
100 TABLET, FILM COATED ORAL DAILY
Qty: 90 TABLET | Refills: 0 | Status: SHIPPED | OUTPATIENT
Start: 2019-07-11 | End: 2019-10-09

## 2019-07-11 NOTE — TELEPHONE ENCOUNTER
No problem, will refill zoloft 100 mg for 90 days. Pt is overdue for physical, pap, mammogram and colon cancer screening and labs. Please have her schedule appt for annual physical before next refill and complete labs BEFORE appt.       Jaden Davis
Pt states that she upps her Zoloft from 75mg to 100 mg when she is having difficulty. Pt has been staying on 100 mg and is now out of medication- she wants it refilled asap.
Requesting Zoloft 50 mg (takes 75 mg) - pt increased her med to 100mg.  Requesting RX for 100mg  LOV: 6/13/19  Anxiety and depression  (primary encounter diagnosis)  Uncontrolled  Will discontinue zoloft and begin prozac  Discussed may have to increase afte
See results below. Spoke with patient regarding below. Patient is aware she needs to fast before having labs completed. Patient aware, all questions answered.  Patient verbalized understanding
No significant past surgical history

## 2019-10-09 NOTE — PATIENT INSTRUCTIONS
Thank you for choosing Le Jones PA-C at [place] [address]  To Do: Long [place]  1. Patient to get lab work done  2. Continue medications as prescribed  3.   Follow-up around 6 months, sooner if problems    • Please signup for MY CHART, omar insurance company approved your testing, please call Central Scheduling at [phone]  Please allow our office 5 business days to contact you regarding any testing results.     Refill policies:   Allow 3 business days for refills; controlled substances ma

## 2019-10-09 NOTE — PROGRESS NOTES
[facility] Internal Medicine Progress Note    CC:  Patient presents with:  Medication Follow-Up: refills  Cough: x 2 weeks  Sinus Problem      HPI:   HPI  About 1.5 weeks had a terrible productive cough, but was able to get over  It has now come Respiratory: Positive for cough. Negative for chest tightness, shortness of breath and wheezing. Cardiovascular: Negative for chest pain. Gastrointestinal: Negative for nausea and vomiting.    Neurological: Negative for dizziness, light-headedness an specified  Patient begin medications as prescribed, discussed side effects and adverse effects of medication    Will request records from her GYN for cervical cancer screening and mammogram  Patient encouraged to make appointment with gastro for colonoscop right breast     Fibrocystic breast changes     Duct ectasia of breast

## 2019-10-14 ENCOUNTER — TELEPHONE (OUTPATIENT)
Dept: FAMILY MEDICINE CLINIC | Facility: CLINIC | Age: 54
End: 2019-10-14

## 2019-10-14 NOTE — TELEPHONE ENCOUNTER
Received pap results via fax from Johnathan Davis at [facility]. Health maintenance updated. Results abstracted in Ext. Results console. Results placed in Megan's bin for review, then send to scan.

## 2019-12-09 ENCOUNTER — OFFICE VISIT (OUTPATIENT)
Dept: FAMILY MEDICINE CLINIC | Facility: CLINIC | Age: 54
End: 2019-12-09
Payer: COMMERCIAL

## 2019-12-09 VITALS
HEART RATE: 75 BPM | DIASTOLIC BLOOD PRESSURE: 90 MMHG | HEIGHT: 61 IN | OXYGEN SATURATION: 99 % | SYSTOLIC BLOOD PRESSURE: 140 MMHG | BODY MASS INDEX: 37.72 KG/M2 | RESPIRATION RATE: 18 BRPM | WEIGHT: 199.81 LBS | TEMPERATURE: 99 F

## 2019-12-09 DIAGNOSIS — J01.00 ACUTE MAXILLARY SINUSITIS, RECURRENCE NOT SPECIFIED: Primary | ICD-10-CM

## 2019-12-09 PROCEDURE — 99213 OFFICE O/P EST LOW 20 MIN: CPT | Performed by: NURSE PRACTITIONER

## 2019-12-09 RX ORDER — DOXYCYCLINE HYCLATE 100 MG/1
100 CAPSULE ORAL 2 TIMES DAILY
Qty: 14 CAPSULE | Refills: 0 | Status: SHIPPED | OUTPATIENT
Start: 2019-12-09 | End: 2019-12-16

## 2019-12-09 NOTE — PROGRESS NOTES
HPI:   Remington Rollins is a 47year old female who presents with ill symptoms for  1 1/2  weeks. Patient reports congestion, ear pain, sinus pain, OTC cold meds have not been helping. Has tried Advil cold and sinus with not much relief.  Pain is more right • Lipid screening 12/21/10   • Low back pain     axial   • Lymphadenopathy    • Macrocytic anemia    • MVP (mitral valve prolapse)    • Nasal turbinate hypertrophy    • OM (otitis media), acute     b/l   • Otalgia     left   • Otitis externa     right an frontal and maxillary right sided sinus tenderness with palpation. NECK: supple,no adenopathy  LUNGS: clear to auscultation  CARDIO: RRR without murmur    ASSESSMENT AND PLAN:    PLAN:Rosario was seen today for ear problem.     Diagnoses and all orders for t

## 2019-12-09 NOTE — PATIENT INSTRUCTIONS
·  PLAN: Doxycycline, take as directed. Finish all the medication even if you feel better. Avoid sun or tanning during use. · Salt water gargles (1 tsp. Salt in 6 oz lukewarm water), use several times daily to help remove post nasal drip.   · May stop cold

## 2019-12-18 ENCOUNTER — TELEPHONE (OUTPATIENT)
Dept: FAMILY MEDICINE CLINIC | Facility: CLINIC | Age: 54
End: 2019-12-18

## 2019-12-18 NOTE — TELEPHONE ENCOUNTER
Pt needs a script for her sinus infection. She says the following doesn't seem to work for her.  No amoxicillin & no [facility] [address], [address] [facility] [phone], [phone]

## 2019-12-19 NOTE — TELEPHONE ENCOUNTER
Attempted to reach pt, VM \"not set up\". See 12/9/19 [facility] notes, NP advised follow up with Dr. Alec Blackman if symptoms continue.    10/9/19 last OV Eunice Ripa Anxiety and Depression/Asthma/SINUSITIS/Hypothyroidism and was given Bactrim/Medrol Pack

## 2019-12-20 ENCOUNTER — OFFICE VISIT (OUTPATIENT)
Dept: FAMILY MEDICINE CLINIC | Facility: CLINIC | Age: 54
End: 2019-12-20
Payer: COMMERCIAL

## 2019-12-20 VITALS
BODY MASS INDEX: 37.57 KG/M2 | RESPIRATION RATE: 18 BRPM | HEIGHT: 61 IN | OXYGEN SATURATION: 98 % | SYSTOLIC BLOOD PRESSURE: 140 MMHG | TEMPERATURE: 98 F | DIASTOLIC BLOOD PRESSURE: 82 MMHG | HEART RATE: 68 BPM | WEIGHT: 199 LBS

## 2019-12-20 DIAGNOSIS — J01.90 ACUTE SINUSITIS TREATED WITH ANTIBIOTICS IN THE PAST 60 DAYS: ICD-10-CM

## 2019-12-20 DIAGNOSIS — J01.41 ACUTE RECURRENT PANSINUSITIS: Primary | ICD-10-CM

## 2019-12-20 PROCEDURE — 99213 OFFICE O/P EST LOW 20 MIN: CPT | Performed by: NURSE PRACTITIONER

## 2019-12-20 RX ORDER — PREDNISONE 20 MG/1
20 TABLET ORAL 2 TIMES DAILY
Qty: 10 TABLET | Refills: 0 | Status: SHIPPED | OUTPATIENT
Start: 2019-12-20 | End: 2019-12-25

## 2019-12-20 RX ORDER — SULFAMETHOXAZOLE AND TRIMETHOPRIM 800; 160 MG/1; MG/1
1 TABLET ORAL 2 TIMES DAILY
Qty: 20 TABLET | Refills: 0 | Status: SHIPPED | OUTPATIENT
Start: 2019-12-20 | End: 2019-12-30

## 2019-12-20 NOTE — PROGRESS NOTES
CHIEF COMPLAINT:   Patient presents with:  Sinus Problem: s/s return after finishing ABX  Ear Problem: right ear pressure. OTC meds taken      HPI:   Sebastian Villegas is a 47year old female who presents for cold symptoms for  2-3  weeks.  Symptoms improved • Loratadine (CLARITIN) 10 MG Oral Cap Take  by mouth.         Past Medical History:   Diagnosis Date   • Acute maxillary sinusitis    • Acute pharyngitis    • Acute URI    • Allergic reaction    • Allergic rhinitis    • Anxiety disorder    • BV (bacterial SKIN: no rashes or abnormal skin lesions  HEENT: See HPI. LUNGS: denies shortness of breath or wheezing, See HPI  CARDIOVASCULAR: denies chest pain or palpitations   GI: denies N/V/C or abdominal pain  NEURO: + sinus headaches.   No numbness or tingling Risks, benefits, side effects of medication addressed and explained. Patient Instructions     Sinusitis (Antibiotic Treatment)    The sinuses are air-filled spaces within the bones of the face.  They connect to the inside of the nose. Sinusitis is an inf · Over-the-counter antihistamines may help if allergies contributed to your sinusitis.    · Do not use nasal rinses or irrigation during an acute sinus infection, unless your healthcare provider tells you to.  Rinsing may spread the infection to other areas

## 2020-01-16 ENCOUNTER — OFFICE VISIT (OUTPATIENT)
Dept: FAMILY MEDICINE CLINIC | Facility: CLINIC | Age: 55
End: 2020-01-16
Payer: COMMERCIAL

## 2020-01-16 VITALS
OXYGEN SATURATION: 99 % | DIASTOLIC BLOOD PRESSURE: 84 MMHG | TEMPERATURE: 98 F | WEIGHT: 201 LBS | HEART RATE: 80 BPM | RESPIRATION RATE: 16 BRPM | SYSTOLIC BLOOD PRESSURE: 118 MMHG | HEIGHT: 61 IN | BODY MASS INDEX: 37.95 KG/M2

## 2020-01-16 DIAGNOSIS — E03.9 HYPOTHYROIDISM, UNSPECIFIED TYPE: ICD-10-CM

## 2020-01-16 DIAGNOSIS — J45.20 CHRONIC ASTHMA, MILD INTERMITTENT, UNCOMPLICATED: ICD-10-CM

## 2020-01-16 DIAGNOSIS — L23.9 ALLERGIC DERMATITIS: Primary | ICD-10-CM

## 2020-01-16 DIAGNOSIS — J45.20 MILD INTERMITTENT CHRONIC ASTHMA WITHOUT COMPLICATION: ICD-10-CM

## 2020-01-16 DIAGNOSIS — L98.9 SCALP LESION: ICD-10-CM

## 2020-01-16 DIAGNOSIS — Z79.899 ENCOUNTER FOR LONG-TERM (CURRENT) USE OF OTHER MEDICATIONS: ICD-10-CM

## 2020-01-16 PROCEDURE — 99214 OFFICE O/P EST MOD 30 MIN: CPT | Performed by: FAMILY MEDICINE

## 2020-01-16 RX ORDER — MONTELUKAST SODIUM 10 MG/1
TABLET ORAL
Qty: 90 TABLET | Refills: 1 | Status: SHIPPED | OUTPATIENT
Start: 2020-01-16 | End: 2020-09-21

## 2020-01-16 RX ORDER — ALBUTEROL SULFATE 90 UG/1
2 AEROSOL, METERED RESPIRATORY (INHALATION) EVERY 6 HOURS PRN
Qty: 3 INHALER | Refills: 3 | Status: SHIPPED | OUTPATIENT
Start: 2020-01-16

## 2020-01-16 RX ORDER — LEVOTHYROXINE SODIUM 0.07 MG/1
TABLET ORAL
Qty: 90 TABLET | Refills: 1 | Status: SHIPPED | OUTPATIENT
Start: 2020-01-16 | End: 2020-09-21

## 2020-01-16 RX ORDER — METHYLPREDNISOLONE 4 MG/1
TABLET ORAL
Qty: 1 KIT | Refills: 1 | Status: SHIPPED | OUTPATIENT
Start: 2020-01-16 | End: 2020-03-06 | Stop reason: ALTCHOICE

## 2020-01-16 RX ORDER — SERTRALINE HYDROCHLORIDE 100 MG/1
100 TABLET, FILM COATED ORAL DAILY
Qty: 90 TABLET | Refills: 0 | Status: SHIPPED | OUTPATIENT
Start: 2020-01-16 | End: 2020-05-12

## 2020-01-16 NOTE — PROGRESS NOTES
HPI:    Patient ID: Ashly Vasquez is a 47year old female. HPI  Ms. Wisam Lamb is a pleasant 51-year-old female with depression and anxiety, allergies, asthma here today for skin rash involving her upper extremities and lower abdomen and back area.   Thes HFA) 108 (90 Base) MCG/ACT Inhalation Aero Soln Inhale 2 puffs into the lungs every 6 (six) hours as needed for Wheezing. 3 Inhaler 3   • methylPREDNISolone (MEDROL) 4 MG Oral Tablet Therapy Pack As directed.  1 kit 1   • ALPRAZolam (XANAX) 0.25 MG Oral Tab erythema but with no tenderness nor swelling or discharge. This was slightly raised. Vitals reviewed. ASSESSMENT/PLAN:   Allergic dermatitis  (primary encounter diagnosis)  -Likely due to wild rice; I told her that it is best to avoid this.

## 2020-03-06 ENCOUNTER — OFFICE VISIT (OUTPATIENT)
Dept: FAMILY MEDICINE CLINIC | Facility: CLINIC | Age: 55
End: 2020-03-06
Payer: COMMERCIAL

## 2020-03-06 VITALS
SYSTOLIC BLOOD PRESSURE: 126 MMHG | WEIGHT: 200 LBS | DIASTOLIC BLOOD PRESSURE: 80 MMHG | HEART RATE: 74 BPM | RESPIRATION RATE: 18 BRPM | OXYGEN SATURATION: 99 % | TEMPERATURE: 98 F | HEIGHT: 61 IN | BODY MASS INDEX: 37.76 KG/M2

## 2020-03-06 DIAGNOSIS — J45.41 MODERATE PERSISTENT ASTHMA WITH EXACERBATION: Primary | ICD-10-CM

## 2020-03-06 DIAGNOSIS — Z28.21 INFLUENZA VACCINATION DECLINED BY PATIENT: ICD-10-CM

## 2020-03-06 DIAGNOSIS — R68.89 FLU-LIKE SYMPTOMS: ICD-10-CM

## 2020-03-06 DIAGNOSIS — H66.001 NON-RECURRENT ACUTE SUPPURATIVE OTITIS MEDIA OF RIGHT EAR WITHOUT SPONTANEOUS RUPTURE OF TYMPANIC MEMBRANE: ICD-10-CM

## 2020-03-06 DIAGNOSIS — R05.8 CHESTY COUGH: ICD-10-CM

## 2020-03-06 PROCEDURE — 99213 OFFICE O/P EST LOW 20 MIN: CPT | Performed by: FAMILY MEDICINE

## 2020-03-06 RX ORDER — METHYLPREDNISOLONE 4 MG/1
TABLET ORAL
Qty: 1 KIT | Refills: 0 | Status: CANCELLED | OUTPATIENT
Start: 2020-03-06

## 2020-03-06 RX ORDER — AZITHROMYCIN 250 MG/1
TABLET, FILM COATED ORAL
Qty: 6 TABLET | Refills: 0 | Status: CANCELLED | OUTPATIENT
Start: 2020-03-06

## 2020-03-06 RX ORDER — PREDNISONE 20 MG/1
40 TABLET ORAL DAILY
Qty: 10 TABLET | Refills: 0 | Status: SHIPPED | OUTPATIENT
Start: 2020-03-06 | End: 2020-03-11

## 2020-03-06 RX ORDER — DOXYCYCLINE HYCLATE 100 MG/1
100 TABLET, DELAYED RELEASE ORAL 2 TIMES DAILY
Qty: 14 TABLET | Refills: 0 | Status: SHIPPED | OUTPATIENT
Start: 2020-03-06 | End: 2020-03-13

## 2020-03-06 NOTE — PROGRESS NOTES
Patient presents with:  Viral Syndrome: She states she had the flu on monday and she took off for the week and now she states that its moved to her chest and she has chest conegstion with bad cough and right ear pain      HPI:     Aixa Grace is a 47 y bruise/bleed easily. Psychiatric/Behavioral: The patient is not nervous/anxious. No depression.       Physical Exam:     Findings:    /80   Pulse 74   Temp 98.4 °F (36.9 °C) (Oral)   Resp 18   Ht 61\"   Wt 200 lb (90.7 kg)   SpO2 99%   BMI 37.79 kg/ otitis media of right ear without spontaneous rupture of tympanic membrane H66.001 Doxycycline Hyclate 100 MG Oral Tab EC     - Rx: doxycycline.  , suspect bacterial infection   - started on steroids for history of asthma.   - advised symptomatic tx: push f

## 2020-03-06 NOTE — PATIENT INSTRUCTIONS
Asthma (Adult)  Asthma is a disease where the medium and  small air passages within the lung go into spasm and restrict the flow of air. Inflammation and swelling of the airways cause further blockage.  During an acute asthma attack, these factors cause t A pneumococcal (pneumonia) vaccine and yearly flu shot (every fall) are recommended. Ask your doctor about this.   When to seek medical advice  Call your healthcare provider right away if any of these occur:   · Increased wheezing or shortness of breath  · · If your child goes to group , he or she runs a greater risk of getting colds or flu. This may then lead to an ear infection. Help prevent these illnesses by teaching your child to wash his or her hands often.   · If your child has nasal allergies,

## 2020-03-16 ENCOUNTER — OFFICE VISIT (OUTPATIENT)
Dept: FAMILY MEDICINE CLINIC | Facility: CLINIC | Age: 55
End: 2020-03-16
Payer: COMMERCIAL

## 2020-03-16 VITALS
HEART RATE: 74 BPM | WEIGHT: 203 LBS | TEMPERATURE: 99 F | HEIGHT: 61 IN | RESPIRATION RATE: 16 BRPM | DIASTOLIC BLOOD PRESSURE: 80 MMHG | SYSTOLIC BLOOD PRESSURE: 130 MMHG | OXYGEN SATURATION: 98 % | BODY MASS INDEX: 38.33 KG/M2

## 2020-03-16 DIAGNOSIS — Z00.00 LABORATORY EXAMINATION ORDERED AS PART OF A COMPLETE PHYSICAL EXAMINATION: ICD-10-CM

## 2020-03-16 DIAGNOSIS — Z12.11 SCREENING FOR COLON CANCER: ICD-10-CM

## 2020-03-16 DIAGNOSIS — H65.191 ACUTE EFFUSION OF RIGHT EAR: ICD-10-CM

## 2020-03-16 DIAGNOSIS — J40 BRONCHITIS: ICD-10-CM

## 2020-03-16 DIAGNOSIS — Z23 NEED FOR PNEUMOCOCCAL VACCINE: ICD-10-CM

## 2020-03-16 DIAGNOSIS — J45.40 MODERATE PERSISTENT ASTHMATIC BRONCHITIS WITHOUT COMPLICATION: ICD-10-CM

## 2020-03-16 DIAGNOSIS — J45.30 MILD PERSISTENT CHRONIC ASTHMA WITHOUT COMPLICATION: Primary | ICD-10-CM

## 2020-03-16 PROCEDURE — 90732 PPSV23 VACC 2 YRS+ SUBQ/IM: CPT | Performed by: FAMILY MEDICINE

## 2020-03-16 PROCEDURE — 90471 IMMUNIZATION ADMIN: CPT | Performed by: FAMILY MEDICINE

## 2020-03-16 PROCEDURE — 99213 OFFICE O/P EST LOW 20 MIN: CPT | Performed by: FAMILY MEDICINE

## 2020-03-16 RX ORDER — METHYLPREDNISOLONE 4 MG/1
TABLET ORAL
Qty: 1 KIT | Refills: 0 | Status: SHIPPED | OUTPATIENT
Start: 2020-03-16 | End: 2020-09-21 | Stop reason: ALTCHOICE

## 2020-03-16 RX ORDER — BUDESONIDE AND FORMOTEROL FUMARATE DIHYDRATE 160; 4.5 UG/1; UG/1
2 AEROSOL RESPIRATORY (INHALATION) 2 TIMES DAILY
COMMUNITY
End: 2020-09-21

## 2020-03-16 NOTE — PROGRESS NOTES
Patient presents with: Follow - Up: 2 week follow up- she states she is getting better but is still fatigue. She does feel winded when she walks. HPI:  Patient is here for a follow up.  She was seen on 3/6 for asthma exacerbation   She was treated wi Macrocytic anemia     Dyslipidemia     Depression     MVP (mitral valve prolapse)     DDD (degenerative disc disease), lumbar     Anxiety disorder     Diarrhea     Otitis media of both ears     Bloody discharge from right nipple     Papilloma of right matt Social History    Tobacco Use      Smoking status: Never Smoker      Smokeless tobacco: Never Used    Alcohol use: No      Comment: social    Drug use: No      Current Outpatient Medications   Medication Sig Dispense Refill   • methylPREDNISolone (MEDR 03/16/2020    Deferred                Date(s) Deferred    FLULAVAL 6 months & older 0.5 ml Prefilled syringe (80055)                          10/09/2019  03/06/2020        Physical Exam  /80   Pulse 74   Temp 98.5 °F (36.9 °C) (Temporal)   Resp 16 Budesonide-Formoterol Fumarate 160-4.5 MCG/ACT Inhalation Aerosol; Inhale 2 puffs into the lungs 2 (two) times daily. 4 SAMPLES GIVEN TO PATIENT    4. Bronchitis  -steroid pack as above.    Advised to f/u if symptoms are not improving in 3 days.   - Budeson

## 2020-03-18 NOTE — PATIENT INSTRUCTIONS
Viral or Bacterial Bronchitis with Wheezing (Adult)    Bronchitis is an infection of the air passages. It often occurs during a cold and is usually caused by a virus. Symptoms include cough with mucus (phlegm) and low-grade fever.  This illness is contagi · Over-the-counter cough, cold, and sore-throat medicines will not shorten the length of the illness, but they may be helpful to reduce symptoms.  (Note: Don't use decongestants if you have high blood pressure.)  · If you were given an inhaler, use it exact You have a viral bronchitis. Bronchitis is inflammation and swelling of the lining of the lungs. This is often caused by an infection. Symptoms include a dry, hacking cough that is worse at night. The cough may bring up yellow-green mucus.  You may also fee · Over-the-counter cough, cold, and sore-throat medicines will not shorten the length of the illness, but they may help to reduce symptoms. Don't use decongestants if you have high blood pressure.   Follow-up care  Follow up with your healthcare provider, o Experts are not exactly sure what causes asthma. It may be caused by a mix of inherited and environmental factors. People with asthma may have no symptoms until they are exposed to an allergen or trigger.   Healthy lungs  Inside your lungs there are branchi In some people, over time chronic mild inflammation can lead to lasting (permanent) scarring of airways and loss of lung function. Asthma flare-ups  When sensitive airways are irritated by a trigger, the muscles around the airways tighten.  The lining of t Take your medicine as prescribed. Also learn about your asthma triggers. Knowing what causes your asthma to flare up in the first place can help you prevent future breathing problems. If you smoke, get help to quit.   Date Last Reviewed: 3/1/2019  © 2000-2

## 2020-05-12 RX ORDER — SERTRALINE HYDROCHLORIDE 100 MG/1
TABLET, FILM COATED ORAL
Qty: 90 TABLET | Refills: 0 | Status: SHIPPED | OUTPATIENT
Start: 2020-05-12 | End: 2020-09-08

## 2020-05-12 NOTE — TELEPHONE ENCOUNTER
Requesting SERTRALINE  MG  LOV: 3/16/20   RTC: No follow-ups on file. Last Relevant Labs: lab orders pending   Filled: 1/16/20  # 90with 0 refills    No future appointments.

## 2020-05-26 ENCOUNTER — TELEPHONE (OUTPATIENT)
Dept: FAMILY MEDICINE CLINIC | Facility: CLINIC | Age: 55
End: 2020-05-26

## 2020-05-26 DIAGNOSIS — R42 VERTIGO: Primary | ICD-10-CM

## 2020-05-26 NOTE — TELEPHONE ENCOUNTER
Patient was seen in the [place] [address] last Wednesday for vertigo. Patient will be having ppw faxed over from specialist for therapy for the vertigo. Please call patieint.

## 2020-06-03 NOTE — TELEPHONE ENCOUNTER
I was waiting from fax from specialist. Have you received any notes?     Last visit 3/16/2020 - Dr. Jose J Luu for asthma

## 2020-06-08 NOTE — TELEPHONE ENCOUNTER
Patient called to follow up on the request.  She said she needs a referral to [address] [place] [address] before someone will see her to start any paperwork.   Patient spoke with Karl Goncalves at [address] [place] [address] regarding the need for a referral.  Kenn Nuno

## 2020-06-09 NOTE — TELEPHONE ENCOUNTER
[facility]   Cc: [facility] Referral Pool   Phone Number: [phone]             .Reason for the order/referral:VESTIBULAR DISORDER/CONSULT   PCP: Clari Gimenez   Refer to Provider Kayode Downing

## 2020-06-09 NOTE — TELEPHONE ENCOUNTER
Pt was seen at [facility] and is being referred for Vertigo. Pt would like to go to [facility] [employer]. Referral pended. Please advise.

## 2020-09-08 NOTE — TELEPHONE ENCOUNTER
Future Appointments   Date Time Provider Staci Ellis   9/21/2020  4:15 PM Lupe Adjutant, DO [facility] [address]     LOV: 3/16/2020 for acute. Last refill given on 5/12/2020 for #90.  -Medication pended for review. See message below.

## 2020-09-08 NOTE — TELEPHONE ENCOUNTER
SERTRALINE  MG Oral Tab    [facility] [address]  [facility] [phone], [phone]    Future Appointments   Date Time Provider Staci Ellis   9/21/2020  4:15 PM Ninfa Norton DO [facility] [address]     Arlyn Plascencia

## 2020-09-11 RX ORDER — SERTRALINE HYDROCHLORIDE 100 MG/1
100 TABLET, FILM COATED ORAL DAILY
Qty: 90 TABLET | Refills: 0 | Status: SHIPPED | OUTPATIENT
Start: 2020-09-11 | End: 2020-09-21

## 2020-09-21 ENCOUNTER — OFFICE VISIT (OUTPATIENT)
Dept: FAMILY MEDICINE CLINIC | Facility: CLINIC | Age: 55
End: 2020-09-21
Payer: COMMERCIAL

## 2020-09-21 VITALS
HEART RATE: 74 BPM | DIASTOLIC BLOOD PRESSURE: 80 MMHG | BODY MASS INDEX: 38.71 KG/M2 | HEIGHT: 61 IN | WEIGHT: 205 LBS | RESPIRATION RATE: 16 BRPM | TEMPERATURE: 99 F | SYSTOLIC BLOOD PRESSURE: 134 MMHG

## 2020-09-21 DIAGNOSIS — Z71.82 EXERCISE COUNSELING: ICD-10-CM

## 2020-09-21 DIAGNOSIS — Z79.899 ENCOUNTER FOR LONG-TERM (CURRENT) USE OF OTHER MEDICATIONS: ICD-10-CM

## 2020-09-21 DIAGNOSIS — Z12.31 ENCOUNTER FOR SCREENING MAMMOGRAM FOR MALIGNANT NEOPLASM OF BREAST: ICD-10-CM

## 2020-09-21 DIAGNOSIS — J45.30 MILD PERSISTENT CHRONIC ASTHMA WITHOUT COMPLICATION: ICD-10-CM

## 2020-09-21 DIAGNOSIS — F41.9 ANXIETY: ICD-10-CM

## 2020-09-21 DIAGNOSIS — Z12.11 SCREENING FOR COLON CANCER: ICD-10-CM

## 2020-09-21 DIAGNOSIS — Z13.31 DEPRESSION SCREENING: ICD-10-CM

## 2020-09-21 DIAGNOSIS — J45.20 CHRONIC ASTHMA, MILD INTERMITTENT, UNCOMPLICATED: ICD-10-CM

## 2020-09-21 DIAGNOSIS — J45.20 MILD INTERMITTENT CHRONIC ASTHMA WITHOUT COMPLICATION: ICD-10-CM

## 2020-09-21 DIAGNOSIS — R42 VERTIGO: ICD-10-CM

## 2020-09-21 DIAGNOSIS — E66.09 CLASS 2 OBESITY DUE TO EXCESS CALORIES WITHOUT SERIOUS COMORBIDITY WITH BODY MASS INDEX (BMI) OF 38.0 TO 38.9 IN ADULT: ICD-10-CM

## 2020-09-21 DIAGNOSIS — E03.9 HYPOTHYROIDISM, UNSPECIFIED TYPE: ICD-10-CM

## 2020-09-21 DIAGNOSIS — E78.5 DYSLIPIDEMIA: ICD-10-CM

## 2020-09-21 DIAGNOSIS — Z23 FLU VACCINE NEED: ICD-10-CM

## 2020-09-21 DIAGNOSIS — D24.1 PAPILLOMA OF RIGHT BREAST: ICD-10-CM

## 2020-09-21 DIAGNOSIS — Z00.00 WELL ADULT EXAM: Primary | ICD-10-CM

## 2020-09-21 PROBLEM — J45.909 ASTHMA: Status: ACTIVE | Noted: 2020-09-21

## 2020-09-21 PROBLEM — J45.909 ASTHMA (HCC): Status: ACTIVE | Noted: 2020-09-21

## 2020-09-21 PROCEDURE — 90471 IMMUNIZATION ADMIN: CPT | Performed by: FAMILY MEDICINE

## 2020-09-21 PROCEDURE — 3079F DIAST BP 80-89 MM HG: CPT | Performed by: FAMILY MEDICINE

## 2020-09-21 PROCEDURE — 90686 IIV4 VACC NO PRSV 0.5 ML IM: CPT | Performed by: FAMILY MEDICINE

## 2020-09-21 PROCEDURE — 99213 OFFICE O/P EST LOW 20 MIN: CPT | Performed by: FAMILY MEDICINE

## 2020-09-21 PROCEDURE — 3075F SYST BP GE 130 - 139MM HG: CPT | Performed by: FAMILY MEDICINE

## 2020-09-21 PROCEDURE — 99396 PREV VISIT EST AGE 40-64: CPT | Performed by: FAMILY MEDICINE

## 2020-09-21 PROCEDURE — 3008F BODY MASS INDEX DOCD: CPT | Performed by: FAMILY MEDICINE

## 2020-09-21 RX ORDER — LEVOTHYROXINE SODIUM 0.07 MG/1
TABLET ORAL
Qty: 90 TABLET | Refills: 1 | Status: SHIPPED | OUTPATIENT
Start: 2020-09-21 | End: 2021-05-20

## 2020-09-21 RX ORDER — MECLIZINE HYDROCHLORIDE 25 MG/1
25 TABLET ORAL 3 TIMES DAILY PRN
Qty: 90 TABLET | Refills: 0 | Status: SHIPPED | OUTPATIENT
Start: 2020-09-21 | End: 2020-12-20

## 2020-09-21 RX ORDER — MECLIZINE HYDROCHLORIDE 25 MG/1
25 TABLET ORAL 3 TIMES DAILY PRN
COMMUNITY
Start: 2020-05-21 | End: 2020-09-21

## 2020-09-21 RX ORDER — ALPRAZOLAM 0.25 MG/1
0.25 TABLET ORAL DAILY PRN
Qty: 30 TABLET | Refills: 0 | Status: SHIPPED | OUTPATIENT
Start: 2020-09-21

## 2020-09-21 RX ORDER — SERTRALINE HYDROCHLORIDE 100 MG/1
100 TABLET, FILM COATED ORAL DAILY
Qty: 90 TABLET | Refills: 0 | Status: SHIPPED | OUTPATIENT
Start: 2020-09-21 | End: 2021-05-20

## 2020-09-21 RX ORDER — MONTELUKAST SODIUM 10 MG/1
TABLET ORAL
Qty: 90 TABLET | Refills: 1 | Status: SHIPPED | OUTPATIENT
Start: 2020-09-21 | End: 2021-05-20

## 2020-09-21 NOTE — PROGRESS NOTES
Patient presents with:  Physical      HPI:  New patient who is here for her physical exam.    Anxiety: Patient reports that her anxiety is worsening due to the season change. She typically has anxiety that worsens during the spring and fall seasons.   She She has a history of vertigo for which she would like a referral to see her therapist for vestibular rehab. She has a history of BPPV.   currently feeling fine    Smoking: none  Alcohol: occasionally   Drugs: none   Sexual hx: 1 partner   STD hx: declined • Cellulitis of scalp    • Cervical polyp    • Chronic asthma    • Contact dermatitis    • Cough     acute   • DDD (degenerative disc disease), lumbar    • Depression    • Dermatitis    • Disc herniation     Left L5-S1   • Dyslipidemia    • Dyslipidemia • Montelukast Sodium 10 MG Oral Tab TAKE ONE TABLET BY MOUTH ONCE DAILY IN THE EVENING 90 tablet 1   • Sertraline HCl 100 MG Oral Tab Take 1 tablet (100 mg total) by mouth daily.  90 tablet 0   • Meclizine HCl 25 MG Oral Tab Take 1 tablet (25 mg total) by m Cardiovascular: Normal rate, regular rhythm and intact distal pulses. No murmur, rubs or gallops. Pulmonary/Chest: Effort normal and breath sounds normal. No respiratory distress. No wheezes, rhonchi or rales  Abdominal: Soft.  Bowel sounds are normal. N Patient will call if any symptoms worsen. Patient understands and agrees to the above plan. 5. Vertigo  - PHYSICAL THERAPY EXTERNAL  - Meclizine HCl 25 MG Oral Tab; Take 1 tablet (25 mg total) by mouth 3 (three) times daily as needed for Dizziness.   Marcelina Fajardo Requested Prescriptions     Signed Prescriptions Disp Refills   • ALPRAZolam (XANAX) 0.25 MG Oral Tab 30 tablet 0     Sig: Take 1 tablet (0.25 mg total) by mouth daily as needed for Anxiety.    • Levothyroxine Sodium 75 MCG Oral Tab 90 tablet 1     Sig: RAAD Type 2 diabetes or prediabetes All women diagnosed with gestational diabetes Lifelong testing every 3 years   Type 2 diabetes All women with prediabetes Every year   Alcohol misuse All women in this age group At routine exams   Blood pressure All women in Gonorrhea Sexually active women at increased risk for infection At routine exams   Hepatitis C Anyone at increased risk; 1 time for those born between [facility] At routine exams   High cholesterol or triglycerides All women in this age group who are at Meningococcal Women at increased risk for infection – talk with your healthcare provider 1 or more doses   Pneumococcal conjugate vaccine (PCV13) and pneumococcal polysaccharide vaccine (PPSV23) Women at increased risk for infection – talk with your health

## 2020-12-09 ENCOUNTER — TELEPHONE (OUTPATIENT)
Dept: FAMILY MEDICINE CLINIC | Facility: CLINIC | Age: 55
End: 2020-12-09

## 2020-12-10 NOTE — TELEPHONE ENCOUNTER
Spoke with pt to inform her we do not have any Symbicort samples in the office at this time, offered pt \"Zero-Pay\" discount card from [employer]. Pt accepted and asked if that can be mail, confirmed I will mail that to pt's home.

## 2020-12-11 ENCOUNTER — LAB ENCOUNTER (OUTPATIENT)
Dept: LAB | Facility: HOSPITAL | Age: 55
End: 2020-12-11
Attending: INTERNAL MEDICINE
Payer: COMMERCIAL

## 2020-12-11 DIAGNOSIS — Z01.818 PRE-OP TESTING: ICD-10-CM

## 2020-12-14 PROBLEM — Z86.0101 HISTORY OF ADENOMATOUS POLYP OF COLON: Status: ACTIVE | Noted: 2020-12-14

## 2020-12-14 PROBLEM — Z86.010 HISTORY OF ADENOMATOUS POLYP OF COLON: Status: ACTIVE | Noted: 2020-12-14

## 2021-01-29 ENCOUNTER — TELEPHONE (OUTPATIENT)
Dept: FAMILY MEDICINE CLINIC | Facility: CLINIC | Age: 56
End: 2021-01-29

## 2021-01-29 RX ORDER — BUDESONIDE AND FORMOTEROL FUMARATE DIHYDRATE 160; 4.5 UG/1; UG/1
2 AEROSOL RESPIRATORY (INHALATION) DAILY
Qty: 3 INHALER | Refills: 1 | Status: SHIPPED | OUTPATIENT
Start: 2021-01-29 | End: 2021-01-30

## 2021-01-29 NOTE — TELEPHONE ENCOUNTER
Requested Prescriptions     Budesonide-Formoterol Fumarate (SYMBICORT) 160-4.5 MCG/ACT Inhalation Aerosol         Sig: Inhale 2 puffs into the lungs daily.     Disp:  3 Inhaler    Refills:  1    Start: 1/29/2021    Class: Normal    Non-formulary    Last ord

## 2021-01-29 NOTE — TELEPHONE ENCOUNTER
Pt will need prescription for the Symbicort sent to the local [facility] pharmacy. Pt states that she has been getting samples from office-this is why it has been a while since she has had it filled.

## 2021-01-29 NOTE — TELEPHONE ENCOUNTER
Budesonide-Formoterol Fumarate (SYMBICORT) 160-4.5 MCG/ACT Inhalation Aerosol 3 Inhaler 1 1/29/2021    Sig:   Inhale 2 puffs into the lungs daily. Pharmacy is calling to get clarification.

## 2021-02-01 RX ORDER — BUDESONIDE AND FORMOTEROL FUMARATE DIHYDRATE 160; 4.5 UG/1; UG/1
2 AEROSOL RESPIRATORY (INHALATION) 2 TIMES DAILY
Qty: 3 INHALER | Refills: 1 | Status: SHIPPED | OUTPATIENT
Start: 2021-02-01 | End: 2021-02-03

## 2021-02-01 NOTE — TELEPHONE ENCOUNTER
See Bimal, should Symbicort be BID? Last OV 9/21/2020. From OV 3/16/2020:  3. Mild persistent chronic asthma without complication  Will treat with medrol dosepack. Continue with symbicort. Samples given. F/u if symptoms are not improved.    Jim Varner

## 2021-02-01 NOTE — TELEPHONE ENCOUNTER
Routing comment:    Likely BID, but patient has not seen my in 3 years so I don't know her regimen.  I think she sees Dr. Jennifer Soler. Veda Rey send to her and update pcp if correct. [facility] signed under my name probably because it passed protocol.

## 2021-02-03 RX ORDER — BUDESONIDE AND FORMOTEROL FUMARATE DIHYDRATE 160; 4.5 UG/1; UG/1
2 AEROSOL RESPIRATORY (INHALATION) 2 TIMES DAILY
Qty: 3 INHALER | Refills: 1 | Status: SHIPPED | OUTPATIENT
Start: 2021-02-03

## 2021-02-03 NOTE — TELEPHONE ENCOUNTER
Received PA response from [facility] and states generic Symbicort has been denied based under the patients plan. They state the preferred alternative is BRAND Symbicort.    -medication pended if ok to send brand name.

## 2021-02-11 ENCOUNTER — TELEPHONE (OUTPATIENT)
Dept: FAMILY MEDICINE CLINIC | Facility: CLINIC | Age: 56
End: 2021-02-11

## 2021-02-11 NOTE — TELEPHONE ENCOUNTER
Spoke with Severino, pharmacist, asking if Symbicort can be changed to generic. Because Rx is written DIMA. Gave verbal to allow Rx to be filled with generic.

## 2021-05-10 DIAGNOSIS — E03.9 HYPOTHYROIDISM, UNSPECIFIED TYPE: ICD-10-CM

## 2021-05-10 RX ORDER — LEVOTHYROXINE SODIUM 0.07 MG/1
TABLET ORAL
Qty: 30 TABLET | Refills: 0 | OUTPATIENT
Start: 2021-05-10

## 2021-05-10 NOTE — TELEPHONE ENCOUNTER
Thyroid Supplements Protocol Vuqwyf62/10/2021 09:38 AM   TSH test in past 12 months Protocol Details    TSH value between 0.350 and 5.500 IU/ml     Appointment in past 12 or next 3 months      Refill protocol failed because the patient did not meet the pro

## 2021-05-10 NOTE — TELEPHONE ENCOUNTER
Future Appointments   Date Time Provider Staci Rhonda   5/20/2021  9:30 AM Abhishek Stafford, DO [facility] [address]   5/25/2021 12:00 PM ANAND Campo [facility]     Pt states that she has enough medication to last until this appt.

## 2021-05-20 ENCOUNTER — LAB ENCOUNTER (OUTPATIENT)
Dept: LAB | Age: 56
End: 2021-05-20
Attending: FAMILY MEDICINE
Payer: COMMERCIAL

## 2021-05-20 ENCOUNTER — OFFICE VISIT (OUTPATIENT)
Dept: FAMILY MEDICINE CLINIC | Facility: CLINIC | Age: 56
End: 2021-05-20
Payer: COMMERCIAL

## 2021-05-20 VITALS
DIASTOLIC BLOOD PRESSURE: 78 MMHG | HEIGHT: 61 IN | WEIGHT: 195.63 LBS | BODY MASS INDEX: 36.94 KG/M2 | HEART RATE: 76 BPM | RESPIRATION RATE: 14 BRPM | TEMPERATURE: 98 F | SYSTOLIC BLOOD PRESSURE: 124 MMHG

## 2021-05-20 DIAGNOSIS — Z00.00 LABORATORY EXAM ORDERED AS PART OF ROUTINE GENERAL MEDICAL EXAMINATION: ICD-10-CM

## 2021-05-20 DIAGNOSIS — E66.09 CLASS 2 OBESITY DUE TO EXCESS CALORIES WITHOUT SERIOUS COMORBIDITY WITH BODY MASS INDEX (BMI) OF 38.0 TO 38.9 IN ADULT: ICD-10-CM

## 2021-05-20 DIAGNOSIS — R53.83 FATIGUE, UNSPECIFIED TYPE: ICD-10-CM

## 2021-05-20 DIAGNOSIS — Z23 NEED FOR VACCINATION: ICD-10-CM

## 2021-05-20 DIAGNOSIS — E55.9 VITAMIN D DEFICIENCY: ICD-10-CM

## 2021-05-20 DIAGNOSIS — E03.9 ACQUIRED HYPOTHYROIDISM: ICD-10-CM

## 2021-05-20 DIAGNOSIS — R06.83 SNORING: Primary | ICD-10-CM

## 2021-05-20 PROBLEM — J30.2 SEASONAL ALLERGIES: Status: ACTIVE | Noted: 2021-05-20

## 2021-05-20 PROBLEM — J45.20 MILD INTERMITTENT ASTHMA WITHOUT COMPLICATION: Status: ACTIVE | Noted: 2020-09-21

## 2021-05-20 PROBLEM — Z86.010 HISTORY OF ADENOMATOUS POLYP OF COLON: Status: RESOLVED | Noted: 2020-12-14 | Resolved: 2021-05-20

## 2021-05-20 PROBLEM — Z86.0101 HISTORY OF ADENOMATOUS POLYP OF COLON: Status: RESOLVED | Noted: 2020-12-14 | Resolved: 2021-05-20

## 2021-05-20 PROBLEM — E66.9 OBESE: Status: ACTIVE | Noted: 2021-05-20

## 2021-05-20 PROBLEM — J45.20 MILD INTERMITTENT ASTHMA WITHOUT COMPLICATION (HCC): Status: ACTIVE | Noted: 2020-09-21

## 2021-05-20 PROBLEM — F41.9 ANXIETY: Status: RESOLVED | Noted: 2020-09-21 | Resolved: 2021-05-20

## 2021-05-20 PROCEDURE — 80061 LIPID PANEL: CPT

## 2021-05-20 PROCEDURE — 80053 COMPREHEN METABOLIC PANEL: CPT

## 2021-05-20 PROCEDURE — 99214 OFFICE O/P EST MOD 30 MIN: CPT | Performed by: FAMILY MEDICINE

## 2021-05-20 PROCEDURE — 90715 TDAP VACCINE 7 YRS/> IM: CPT | Performed by: FAMILY MEDICINE

## 2021-05-20 PROCEDURE — 90471 IMMUNIZATION ADMIN: CPT | Performed by: FAMILY MEDICINE

## 2021-05-20 PROCEDURE — 36415 COLL VENOUS BLD VENIPUNCTURE: CPT

## 2021-05-20 PROCEDURE — 82306 VITAMIN D 25 HYDROXY: CPT

## 2021-05-20 PROCEDURE — 3078F DIAST BP <80 MM HG: CPT | Performed by: FAMILY MEDICINE

## 2021-05-20 PROCEDURE — 3008F BODY MASS INDEX DOCD: CPT | Performed by: FAMILY MEDICINE

## 2021-05-20 PROCEDURE — 83036 HEMOGLOBIN GLYCOSYLATED A1C: CPT

## 2021-05-20 PROCEDURE — 84443 ASSAY THYROID STIM HORMONE: CPT

## 2021-05-20 PROCEDURE — 3074F SYST BP LT 130 MM HG: CPT | Performed by: FAMILY MEDICINE

## 2021-05-20 PROCEDURE — 85025 COMPLETE CBC W/AUTO DIFF WBC: CPT

## 2021-05-20 RX ORDER — LEVOTHYROXINE SODIUM 0.07 MG/1
TABLET ORAL
Qty: 90 TABLET | Refills: 1 | Status: SHIPPED | OUTPATIENT
Start: 2021-05-20

## 2021-05-20 NOTE — PROGRESS NOTES
HPI:   Jing Cotto is a 54year old female. Patient presents with:  Medication Request: needs sleep study    Was on vacation with a friend who noted her extreme snoring and apniec activity.   Her kids have told her this before too but she wasn't read Anxiety. , Disp: 30 tablet, Rfl: 0  •  Albuterol Sulfate HFA (PROAIR HFA) 108 (90 Base) MCG/ACT Inhalation Aero Soln, Inhale 2 puffs into the lungs every 6 (six) hours as needed for Wheezing., Disp: 3 Inhaler, Rfl: 3  •  Loratadine (CLARITIN) 10 MG Oral Cap Sodium 75 MCG Oral Tab; TAKE ONE TABLET BY MOUTH ONCE DAILY BEFORE BREAKFAST  Dispense: 90 tablet; Refill: 1    3. Fatigue, unspecified type  - CBC WITH DIFFERENTIAL WITH PLATELET; Future  - COMP METABOLIC PANEL (14);  Future  - TSH W REFLEX TO FREE T4; Fut

## 2021-05-20 NOTE — PATIENT INSTRUCTIONS
Labs today  Schedule mammogram and pap and have report sent our office please  Schedule sleep study and follow up with pulmonologist/sleep specialist for 1-2 weeks after test

## 2021-05-25 PROBLEM — R73.03 PREDIABETES: Status: ACTIVE | Noted: 2021-05-25

## 2021-06-15 ENCOUNTER — OFFICE VISIT (OUTPATIENT)
Dept: SLEEP CENTER | Age: 56
End: 2021-06-15
Attending: INTERNAL MEDICINE
Payer: COMMERCIAL

## 2021-06-15 DIAGNOSIS — R06.83 SNORING: ICD-10-CM

## 2021-06-15 PROCEDURE — 95810 POLYSOM 6/> YRS 4/> PARAM: CPT

## 2021-06-24 ENCOUNTER — HOSPITAL ENCOUNTER (OUTPATIENT)
Dept: MAMMOGRAPHY | Age: 56
Discharge: HOME | End: 2021-06-24
Attending: FAMILY MEDICINE
Payer: COMMERCIAL

## 2021-06-24 DIAGNOSIS — Z12.31 ENCOUNTER FOR SCREENING MAMMOGRAM FOR MALIGNANT NEOPLASM OF BREAST: ICD-10-CM

## 2021-06-24 PROCEDURE — 77063 BREAST TOMOSYNTHESIS BI: CPT | Performed by: FAMILY MEDICINE

## 2021-06-24 PROCEDURE — 77067 SCR MAMMO BI INCL CAD: CPT | Performed by: FAMILY MEDICINE

## 2021-10-15 ENCOUNTER — ORDER TRANSCRIPTION (OUTPATIENT)
Dept: SLEEP CENTER | Age: 56
End: 2021-10-15

## 2021-10-15 DIAGNOSIS — Z01.818 PREOP EXAMINATION: Primary | ICD-10-CM

## 2021-10-15 DIAGNOSIS — Z11.59 SCREENING FOR VIRAL DISEASE: ICD-10-CM

## 2021-11-09 ENCOUNTER — LAB ENCOUNTER (OUTPATIENT)
Dept: LAB | Age: 56
End: 2021-11-09
Attending: INTERNAL MEDICINE
Payer: COMMERCIAL

## 2021-11-09 DIAGNOSIS — Z01.818 PREOP EXAMINATION: ICD-10-CM

## 2021-11-09 DIAGNOSIS — Z11.59 SCREENING FOR VIRAL DISEASE: ICD-10-CM

## 2021-11-12 ENCOUNTER — OFFICE VISIT (OUTPATIENT)
Dept: SLEEP CENTER | Age: 56
End: 2021-11-12
Attending: INTERNAL MEDICINE
Payer: COMMERCIAL

## 2021-11-12 DIAGNOSIS — R53.83 FATIGUE, UNSPECIFIED TYPE: ICD-10-CM

## 2021-11-12 DIAGNOSIS — G47.33 OSA (OBSTRUCTIVE SLEEP APNEA): ICD-10-CM

## 2021-11-12 PROCEDURE — 95811 POLYSOM 6/>YRS CPAP 4/> PARM: CPT

## 2022-01-26 ENCOUNTER — TELEPHONE (OUTPATIENT)
Dept: SURGERY | Facility: CLINIC | Age: 57
End: 2022-01-26

## 2022-01-26 NOTE — TELEPHONE ENCOUNTER
Received Medical Records for patients upcoming office visit from [facility], endorsed to Provider for review.

## 2022-02-21 ENCOUNTER — HOSPITAL ENCOUNTER (OUTPATIENT)
Dept: LAB | Facility: HOSPITAL | Age: 57
Discharge: HOME | End: 2022-02-21
Attending: INTERNAL MEDICINE
Payer: COMMERCIAL

## 2022-02-21 LAB
ALBUMIN SERPL-MCNC: 3.5 G/DL (ref 3.4–5)
ALBUMIN/GLOB SERPL: 1 {RATIO} (ref 1–2)
ALP LIVER SERPL-CCNC: 99 U/L
ALT SERPL-CCNC: 27 U/L
ANION GAP SERPL CALC-SCNC: 3 MMOL/L (ref 0–18)
AST SERPL-CCNC: 19 U/L (ref 15–37)
BASOPHILS # BLD AUTO: 0.07 X10(3) UL (ref 0–0.2)
BASOPHILS NFR BLD AUTO: 1 %
BILIRUB SERPL-MCNC: 0.4 MG/DL (ref 0.1–2)
BUN BLD-MCNC: 18 MG/DL (ref 7–18)
CALCIUM BLD-MCNC: 8.8 MG/DL (ref 8.5–10.1)
CHLORIDE SERPL-SCNC: 109 MMOL/L (ref 98–112)
CHOLEST SERPL-MCNC: 207 MG/DL (ref ?–200)
CO2 SERPL-SCNC: 29 MMOL/L (ref 21–32)
CREAT BLD-MCNC: 0.78 MG/DL
EOSINOPHIL # BLD AUTO: 0.31 X10(3) UL (ref 0–0.7)
EOSINOPHIL NFR BLD AUTO: 4.6 %
ERYTHROCYTE [DISTWIDTH] IN BLOOD BY AUTOMATED COUNT: 13.5 %
FASTING PATIENT LIPID ANSWER: YES
FASTING STATUS PATIENT QL REPORTED: YES
GLOBULIN PLAS-MCNC: 3.5 G/DL (ref 2.8–4.4)
GLUCOSE BLD-MCNC: 101 MG/DL (ref 70–99)
HCT VFR BLD AUTO: 43.4 %
HDLC SERPL-MCNC: 40 MG/DL (ref 40–59)
HGB BLD-MCNC: 14.2 G/DL
IMM GRANULOCYTES # BLD AUTO: 0.03 X10(3) UL (ref 0–1)
IMM GRANULOCYTES NFR BLD: 0.4 %
LDLC SERPL CALC-MCNC: 129 MG/DL (ref ?–100)
LYMPHOCYTES # BLD AUTO: 1.87 X10(3) UL (ref 1–4)
LYMPHOCYTES NFR BLD AUTO: 28 %
MCH RBC QN AUTO: 26.9 PG (ref 26–34)
MCHC RBC AUTO-ENTMCNC: 32.7 G/DL (ref 31–37)
MCV RBC AUTO: 82.4 FL
MONOCYTES # BLD AUTO: 0.35 X10(3) UL (ref 0.1–1)
MONOCYTES NFR BLD AUTO: 5.2 %
NEUTROPHILS # BLD AUTO: 4.06 X10 (3) UL (ref 1.5–7.7)
NEUTROPHILS # BLD AUTO: 4.06 X10(3) UL (ref 1.5–7.7)
NEUTROPHILS NFR BLD AUTO: 60.8 %
NONHDLC SERPL-MCNC: 167 MG/DL (ref ?–130)
OSMOLALITY SERPL CALC.SUM OF ELEC: 294 MOSM/KG (ref 275–295)
PLATELET # BLD AUTO: 270 10(3)UL (ref 150–450)
POTASSIUM SERPL-SCNC: 4 MMOL/L (ref 3.5–5.1)
PROT SERPL-MCNC: 7 G/DL (ref 6.4–8.2)
RBC # BLD AUTO: 5.27 X10(6)UL
TRIGL SERPL-MCNC: 212 MG/DL (ref 30–149)
TSI SER-ACNC: 1.37 MIU/ML (ref 0.36–3.74)
VLDLC SERPL CALC-MCNC: 39 MG/DL (ref 0–30)
WBC # BLD AUTO: 6.7 X10(3) UL (ref 4–11)

## 2022-02-21 PROCEDURE — 36415 COLL VENOUS BLD VENIPUNCTURE: CPT | Performed by: INTERNAL MEDICINE

## 2022-02-21 PROCEDURE — 85025 COMPLETE CBC W/AUTO DIFF WBC: CPT | Performed by: INTERNAL MEDICINE

## 2022-02-21 PROCEDURE — 80061 LIPID PANEL: CPT | Performed by: INTERNAL MEDICINE

## 2022-02-21 PROCEDURE — 84443 ASSAY THYROID STIM HORMONE: CPT | Performed by: INTERNAL MEDICINE

## 2022-02-21 PROCEDURE — 80053 COMPREHEN METABOLIC PANEL: CPT | Performed by: INTERNAL MEDICINE

## 2022-02-22 ENCOUNTER — OFFICE VISIT (OUTPATIENT)
Dept: NEUROLOGY | Facility: CLINIC | Age: 57
End: 2022-02-22
Payer: COMMERCIAL

## 2022-02-22 VITALS
SYSTOLIC BLOOD PRESSURE: 118 MMHG | WEIGHT: 200 LBS | DIASTOLIC BLOOD PRESSURE: 72 MMHG | HEIGHT: 61 IN | RESPIRATION RATE: 18 BRPM | HEART RATE: 70 BPM | BODY MASS INDEX: 37.76 KG/M2

## 2022-02-22 DIAGNOSIS — G44.83 PRIMARY COUGH HEADACHE: ICD-10-CM

## 2022-02-22 DIAGNOSIS — R42 DIZZINESS: ICD-10-CM

## 2022-02-22 DIAGNOSIS — R42 VERTIGO: Primary | ICD-10-CM

## 2022-02-22 DIAGNOSIS — G43.109 MIGRAINE WITH AURA AND WITHOUT STATUS MIGRAINOSUS, NOT INTRACTABLE: ICD-10-CM

## 2022-02-22 PROCEDURE — 3074F SYST BP LT 130 MM HG: CPT | Performed by: OTHER

## 2022-02-22 PROCEDURE — 3078F DIAST BP <80 MM HG: CPT | Performed by: OTHER

## 2022-02-22 PROCEDURE — 99204 OFFICE O/P NEW MOD 45 MIN: CPT | Performed by: OTHER

## 2022-02-22 PROCEDURE — 3008F BODY MASS INDEX DOCD: CPT | Performed by: OTHER

## 2022-02-22 RX ORDER — AMLODIPINE BESYLATE 5 MG/1
TABLET ORAL
COMMUNITY
Start: 2021-12-08

## 2022-02-22 NOTE — PROGRESS NOTES
New Patient for Headaches- Patient states she started having headaches early December after a \"coughing fit\". Patient states the headache lasted for 2 days. Patient states she felt very disoriented & experienced nausea alongside the head pain. Patient states she has been headache free since.

## 2022-04-01 ENCOUNTER — TELEPHONE (OUTPATIENT)
Dept: FAMILY MEDICINE CLINIC | Facility: CLINIC | Age: 57
End: 2022-04-01

## 2022-04-01 RX ORDER — LEVOTHYROXINE SODIUM 0.07 MG/1
TABLET ORAL
Qty: 30 TABLET | Refills: 0 | Status: SHIPPED | OUTPATIENT
Start: 2022-04-01

## 2022-04-01 NOTE — TELEPHONE ENCOUNTER
Patient scheduled an appt and will need a refill for the medication to get through to her appt.   Future Appointments   Date Time Provider Staci Ellis   4/28/2022  4:00 PM Luke Hansen DO [facility] [address]   5/4/2022 12:20 [facility]

## 2022-04-01 NOTE — TELEPHONE ENCOUNTER
Received fax from [facility] requesting new 90 day prescription for Euthrox tabs 75 mcg.      LOV: actue visit: 5/20/21  Annual physical 9/21/20  Last filled: 5/20/21    Please call pt to schedule annual exam

## 2022-04-28 ENCOUNTER — OFFICE VISIT (OUTPATIENT)
Dept: FAMILY MEDICINE CLINIC | Facility: CLINIC | Age: 57
End: 2022-04-28
Payer: COMMERCIAL

## 2022-04-28 VITALS
TEMPERATURE: 97 F | HEIGHT: 61 IN | WEIGHT: 203.25 LBS | RESPIRATION RATE: 16 BRPM | HEART RATE: 68 BPM | SYSTOLIC BLOOD PRESSURE: 130 MMHG | OXYGEN SATURATION: 98 % | BODY MASS INDEX: 38.37 KG/M2 | DIASTOLIC BLOOD PRESSURE: 80 MMHG

## 2022-04-28 DIAGNOSIS — Z12.31 BREAST CANCER SCREENING BY MAMMOGRAM: ICD-10-CM

## 2022-04-28 DIAGNOSIS — Z00.00 ANNUAL PHYSICAL EXAM: Primary | ICD-10-CM

## 2022-04-28 DIAGNOSIS — J45.20 MILD INTERMITTENT ASTHMA WITHOUT COMPLICATION: ICD-10-CM

## 2022-04-28 DIAGNOSIS — E03.9 ACQUIRED HYPOTHYROIDISM: ICD-10-CM

## 2022-04-28 DIAGNOSIS — E66.09 CLASS 2 OBESITY DUE TO EXCESS CALORIES WITHOUT SERIOUS COMORBIDITY WITH BODY MASS INDEX (BMI) OF 38.0 TO 38.9 IN ADULT: ICD-10-CM

## 2022-04-28 DIAGNOSIS — Z13.31 POSITIVE DEPRESSION SCREENING: ICD-10-CM

## 2022-04-28 PROBLEM — G47.33 OBSTRUCTIVE SLEEP APNEA SYNDROME: Status: ACTIVE | Noted: 2022-01-25

## 2022-04-28 PROBLEM — Z99.89 OSA ON CPAP: Status: ACTIVE | Noted: 2022-01-25

## 2022-04-28 PROBLEM — Z86.16 HISTORY OF COVID-19: Status: ACTIVE | Noted: 2022-04-28

## 2022-04-28 PROBLEM — I10 HTN (HYPERTENSION): Status: ACTIVE | Noted: 2022-01-25

## 2022-04-28 PROBLEM — G47.33 OSA ON CPAP: Status: ACTIVE | Noted: 2022-01-25

## 2022-04-28 PROCEDURE — 3008F BODY MASS INDEX DOCD: CPT | Performed by: FAMILY MEDICINE

## 2022-04-28 PROCEDURE — 99213 OFFICE O/P EST LOW 20 MIN: CPT | Performed by: FAMILY MEDICINE

## 2022-04-28 PROCEDURE — 3079F DIAST BP 80-89 MM HG: CPT | Performed by: FAMILY MEDICINE

## 2022-04-28 PROCEDURE — 3075F SYST BP GE 130 - 139MM HG: CPT | Performed by: FAMILY MEDICINE

## 2022-04-28 PROCEDURE — 99396 PREV VISIT EST AGE 40-64: CPT | Performed by: FAMILY MEDICINE

## 2022-04-28 RX ORDER — LEVOTHYROXINE SODIUM 0.07 MG/1
TABLET ORAL
Qty: 90 TABLET | Refills: 3 | Status: SHIPPED | OUTPATIENT
Start: 2022-04-28

## 2022-05-04 ENCOUNTER — TELEPHONE (OUTPATIENT)
Dept: FAMILY MEDICINE CLINIC | Facility: CLINIC | Age: 57
End: 2022-05-04

## 2022-05-04 RX ORDER — LEVOTHYROXINE SODIUM 0.07 MG/1
TABLET ORAL
Qty: 30 TABLET | Refills: 0 | Status: SHIPPED | OUTPATIENT
Start: 2022-05-04

## 2022-05-04 NOTE — TELEPHONE ENCOUNTER
Patient requesting refills to be sent to local pharmacy, will be running out of medication.  Refills sent on 4-28 were sent to mail order pharmacy, please advise

## 2022-06-10 ENCOUNTER — TELEPHONE (OUTPATIENT)
Dept: FAMILY MEDICINE CLINIC | Facility: CLINIC | Age: 57
End: 2022-06-10

## 2022-06-10 RX ORDER — SERTRALINE HYDROCHLORIDE 100 MG/1
TABLET, FILM COATED ORAL
Qty: 90 TABLET | Refills: 3 | Status: SHIPPED | OUTPATIENT
Start: 2022-06-10

## 2022-06-10 NOTE — TELEPHONE ENCOUNTER
Patient calling, patient requesting Sertraline refill. Patient states PCP was to take over prescription at last visit. Patient will be in need of a refill soon.  Please advise

## 2022-06-10 NOTE — TELEPHONE ENCOUNTER
See phone message below:    Were you going to take over prescribing her Sertraline? Last Rx'd 2/28/22 by Grayson Bautista.     Last OV 4/28/22

## 2022-07-06 ENCOUNTER — OFFICE VISIT (OUTPATIENT)
Dept: INTERNAL MEDICINE CLINIC | Facility: CLINIC | Age: 57
End: 2022-07-06
Payer: COMMERCIAL

## 2022-07-06 VITALS
SYSTOLIC BLOOD PRESSURE: 126 MMHG | DIASTOLIC BLOOD PRESSURE: 84 MMHG | BODY MASS INDEX: 38.71 KG/M2 | HEART RATE: 72 BPM | HEIGHT: 61 IN | WEIGHT: 205 LBS | RESPIRATION RATE: 16 BRPM

## 2022-07-06 DIAGNOSIS — G47.33 OSA ON CPAP: ICD-10-CM

## 2022-07-06 DIAGNOSIS — R73.03 PREDIABETES: ICD-10-CM

## 2022-07-06 DIAGNOSIS — E78.5 DYSLIPIDEMIA: ICD-10-CM

## 2022-07-06 DIAGNOSIS — E66.9 OBESITY (BMI 35.0-39.9 WITHOUT COMORBIDITY): ICD-10-CM

## 2022-07-06 DIAGNOSIS — Z99.89 OSA ON CPAP: ICD-10-CM

## 2022-07-06 DIAGNOSIS — Z51.81 ENCOUNTER FOR THERAPEUTIC DRUG MONITORING: Primary | ICD-10-CM

## 2022-07-06 DIAGNOSIS — I10 PRIMARY HYPERTENSION: ICD-10-CM

## 2022-07-06 PROCEDURE — 99204 OFFICE O/P NEW MOD 45 MIN: CPT | Performed by: PHYSICIAN ASSISTANT

## 2022-07-06 PROCEDURE — 3008F BODY MASS INDEX DOCD: CPT | Performed by: PHYSICIAN ASSISTANT

## 2022-07-06 PROCEDURE — 3074F SYST BP LT 130 MM HG: CPT | Performed by: PHYSICIAN ASSISTANT

## 2022-07-06 PROCEDURE — 3079F DIAST BP 80-89 MM HG: CPT | Performed by: PHYSICIAN ASSISTANT

## 2022-07-06 RX ORDER — BUPROPION HYDROCHLORIDE 150 MG/1
150 TABLET ORAL DAILY
Qty: 90 TABLET | Refills: 0 | Status: SHIPPED | OUTPATIENT
Start: 2022-07-06

## 2022-09-15 ENCOUNTER — OFFICE VISIT (OUTPATIENT)
Dept: INTERNAL MEDICINE CLINIC | Facility: CLINIC | Age: 57
End: 2022-09-15
Payer: COMMERCIAL

## 2022-09-15 VITALS
BODY MASS INDEX: 37.38 KG/M2 | HEIGHT: 61 IN | HEART RATE: 74 BPM | RESPIRATION RATE: 16 BRPM | DIASTOLIC BLOOD PRESSURE: 82 MMHG | WEIGHT: 198 LBS | SYSTOLIC BLOOD PRESSURE: 120 MMHG

## 2022-09-15 DIAGNOSIS — G47.33 OSA ON CPAP: ICD-10-CM

## 2022-09-15 DIAGNOSIS — Z99.89 OSA ON CPAP: ICD-10-CM

## 2022-09-15 DIAGNOSIS — F32.A ANXIETY AND DEPRESSION: ICD-10-CM

## 2022-09-15 DIAGNOSIS — E66.9 OBESITY (BMI 35.0-39.9 WITHOUT COMORBIDITY): ICD-10-CM

## 2022-09-15 DIAGNOSIS — I10 PRIMARY HYPERTENSION: ICD-10-CM

## 2022-09-15 DIAGNOSIS — R73.03 PREDIABETES: ICD-10-CM

## 2022-09-15 DIAGNOSIS — Z51.81 ENCOUNTER FOR THERAPEUTIC DRUG MONITORING: Primary | ICD-10-CM

## 2022-09-15 DIAGNOSIS — F41.9 ANXIETY AND DEPRESSION: ICD-10-CM

## 2022-09-15 DIAGNOSIS — E78.5 DYSLIPIDEMIA: ICD-10-CM

## 2022-09-15 PROCEDURE — 3079F DIAST BP 80-89 MM HG: CPT | Performed by: PHYSICIAN ASSISTANT

## 2022-09-15 PROCEDURE — 3074F SYST BP LT 130 MM HG: CPT | Performed by: PHYSICIAN ASSISTANT

## 2022-09-15 PROCEDURE — 99214 OFFICE O/P EST MOD 30 MIN: CPT | Performed by: PHYSICIAN ASSISTANT

## 2022-09-15 PROCEDURE — 3008F BODY MASS INDEX DOCD: CPT | Performed by: PHYSICIAN ASSISTANT

## 2022-09-15 RX ORDER — BUPROPION HYDROCHLORIDE 150 MG/1
150 TABLET ORAL DAILY
Qty: 90 TABLET | Refills: 0 | Status: SHIPPED | OUTPATIENT
Start: 2022-09-15

## 2022-12-13 NOTE — TELEPHONE ENCOUNTER
Requesting Bupropion XL 150mg 1 tab daily   LOV: 9/15/22  RTC:   Last Relevant Labs:   Filled: 9/15/22 #90 with 0 refills    No future appointments.

## 2022-12-15 RX ORDER — BUPROPION HYDROCHLORIDE 150 MG/1
150 TABLET ORAL DAILY
Qty: 90 TABLET | Refills: 0 | Status: SHIPPED | OUTPATIENT
Start: 2022-12-15

## 2022-12-15 RX ORDER — SERTRALINE HYDROCHLORIDE 100 MG/1
TABLET, FILM COATED ORAL
Qty: 90 TABLET | Refills: 0 | Status: SHIPPED | OUTPATIENT
Start: 2022-12-15

## 2023-04-04 RX ORDER — BUPROPION HYDROCHLORIDE 150 MG/1
TABLET ORAL
Qty: 90 TABLET | Refills: 0 | OUTPATIENT
Start: 2023-04-04

## 2023-04-04 RX ORDER — BUPROPION HYDROCHLORIDE 150 MG/1
150 TABLET ORAL DAILY
Qty: 90 TABLET | Refills: 0 | Status: SHIPPED | OUTPATIENT
Start: 2023-04-04

## 2023-04-04 NOTE — TELEPHONE ENCOUNTER
Pt requesting a refill of buPROPion  MG Oral Tablet 24 Hr    Pt will be changing PCP to Dr. Ariel Marinelli, previously Dr. Shameka Wilson Pt. Please fill a temp refill to get Pt to next appt.     Future Appointments   Date Time Provider Staci Ellis   5/4/2023  2:00 PM Agatha Agudelo, DO [facility] [address]

## 2023-04-04 NOTE — TELEPHONE ENCOUNTER
Requesting bupropion  mg  LOV: 9/15/22  RTC: not noted  Last Relevant Labs: na  Filled: [id number] #90 with 0 refills    No future appointments. Pt cancelled appt in November  Denied refill as it has been over 6 months since seen.   Must schedule

## 2023-04-24 ENCOUNTER — TELEPHONE (OUTPATIENT)
Dept: FAMILY MEDICINE CLINIC | Facility: CLINIC | Age: 58
End: 2023-04-24

## 2023-04-24 DIAGNOSIS — Z00.00 LABORATORY EXAMINATION ORDERED AS PART OF A ROUTINE GENERAL MEDICAL EXAMINATION: Primary | ICD-10-CM

## 2023-05-04 ENCOUNTER — OFFICE VISIT (OUTPATIENT)
Dept: FAMILY MEDICINE CLINIC | Facility: CLINIC | Age: 58
End: 2023-05-04
Payer: COMMERCIAL

## 2023-05-04 VITALS
RESPIRATION RATE: 16 BRPM | SYSTOLIC BLOOD PRESSURE: 132 MMHG | TEMPERATURE: 97 F | BODY MASS INDEX: 39.18 KG/M2 | HEIGHT: 61 IN | WEIGHT: 207.5 LBS | OXYGEN SATURATION: 98 % | DIASTOLIC BLOOD PRESSURE: 80 MMHG | HEART RATE: 64 BPM

## 2023-05-04 DIAGNOSIS — Z12.31 SCREENING MAMMOGRAM, ENCOUNTER FOR: Primary | ICD-10-CM

## 2023-05-04 DIAGNOSIS — F41.9 ANXIETY: ICD-10-CM

## 2023-05-04 DIAGNOSIS — E78.5 DYSLIPIDEMIA: ICD-10-CM

## 2023-05-04 DIAGNOSIS — I10 PRIMARY HYPERTENSION: ICD-10-CM

## 2023-05-04 DIAGNOSIS — J45.20 MILD INTERMITTENT ASTHMA WITHOUT COMPLICATION: ICD-10-CM

## 2023-05-04 DIAGNOSIS — E03.9 ACQUIRED HYPOTHYROIDISM: ICD-10-CM

## 2023-05-04 DIAGNOSIS — I34.1 MVP (MITRAL VALVE PROLAPSE): ICD-10-CM

## 2023-05-04 PROCEDURE — 3075F SYST BP GE 130 - 139MM HG: CPT | Performed by: FAMILY MEDICINE

## 2023-05-04 PROCEDURE — 3079F DIAST BP 80-89 MM HG: CPT | Performed by: FAMILY MEDICINE

## 2023-05-04 PROCEDURE — 99396 PREV VISIT EST AGE 40-64: CPT | Performed by: FAMILY MEDICINE

## 2023-05-04 PROCEDURE — 3008F BODY MASS INDEX DOCD: CPT | Performed by: FAMILY MEDICINE

## 2023-05-04 RX ORDER — ALPRAZOLAM 0.25 MG/1
0.25 TABLET ORAL DAILY PRN
Qty: 30 TABLET | Refills: 0 | Status: SHIPPED | OUTPATIENT
Start: 2023-05-04

## 2023-05-04 RX ORDER — BUPROPION HYDROCHLORIDE 150 MG/1
150 TABLET ORAL DAILY
Qty: 90 TABLET | Refills: 0 | Status: SHIPPED | OUTPATIENT
Start: 2023-05-04

## 2023-05-04 RX ORDER — AMLODIPINE BESYLATE 5 MG/1
5 TABLET ORAL DAILY
Qty: 90 TABLET | Refills: 0 | Status: SHIPPED | OUTPATIENT
Start: 2023-05-04 | End: 2023-08-02

## 2023-05-12 ENCOUNTER — HOSPITAL ENCOUNTER (OUTPATIENT)
Dept: MAMMOGRAPHY | Age: 58
Discharge: HOME | End: 2023-05-12
Attending: FAMILY MEDICINE
Payer: COMMERCIAL

## 2023-05-12 DIAGNOSIS — Z12.31 SCREENING MAMMOGRAM, ENCOUNTER FOR: ICD-10-CM

## 2023-05-12 PROCEDURE — 77067 SCR MAMMO BI INCL CAD: CPT | Performed by: FAMILY MEDICINE

## 2023-05-12 PROCEDURE — 77063 BREAST TOMOSYNTHESIS BI: CPT | Performed by: FAMILY MEDICINE

## 2023-06-30 DIAGNOSIS — J45.20 MILD INTERMITTENT ASTHMA WITHOUT COMPLICATION: ICD-10-CM

## 2023-07-03 RX ORDER — MOMETASONE FUROATE AND FORMOTEROL FUMARATE DIHYDRATE 100; 5 UG/1; UG/1
2 AEROSOL RESPIRATORY (INHALATION) 2 TIMES DAILY
Qty: 8.8 G | Refills: 0 | Status: SHIPPED | OUTPATIENT
Start: 2023-07-03

## 2023-08-24 DIAGNOSIS — J45.20 MILD INTERMITTENT ASTHMA WITHOUT COMPLICATION: ICD-10-CM

## 2023-08-24 DIAGNOSIS — E03.9 ACQUIRED HYPOTHYROIDISM: ICD-10-CM

## 2023-08-24 RX ORDER — LEVOTHYROXINE SODIUM 0.07 MG/1
TABLET ORAL
Qty: 30 TABLET | Refills: 0 | Status: SHIPPED | OUTPATIENT
Start: 2023-08-24

## 2023-08-24 NOTE — TELEPHONE ENCOUNTER
Requesting Levothyroxine 75mcg  LOV: 5/4/23 Physical  RTC: 1 year  Last Relevant Labs: 2/21/22  Filled: 5/4/22 #30 with 0 refills    No future appointments.     #30 sent with message advising patient to complete labs

## 2023-08-28 RX ORDER — MOMETASONE FUROATE AND FORMOTEROL FUMARATE DIHYDRATE 100; 5 UG/1; UG/1
AEROSOL RESPIRATORY (INHALATION)
Qty: 8.8 G | Refills: 0 | Status: SHIPPED | OUTPATIENT
Start: 2023-08-28

## 2023-08-28 RX ORDER — AMLODIPINE BESYLATE 5 MG/1
5 TABLET ORAL DAILY
Qty: 30 TABLET | Refills: 0 | Status: SHIPPED | OUTPATIENT
Start: 2023-08-28

## 2023-08-28 RX ORDER — MOMETASONE FUROATE AND FORMOTEROL FUMARATE DIHYDRATE 100; 5 UG/1; UG/1
AEROSOL RESPIRATORY (INHALATION)
Qty: 8.8 G | Refills: 0 | OUTPATIENT
Start: 2023-08-28

## 2023-08-28 NOTE — TELEPHONE ENCOUNTER
Requesting Amlodipine 5mg  LOV: 5/4/23 Physical  RTC: 1 year  Last Relevant Labs: 2/21/22  Filled: 5/4/23 #90 with 3 refills    No future appointments. Hypertension Medications Protocol [id number]/27/2023 11:11 PM   Protocol Details CMP or BMP in past 12 months    Last serum creatinine< 2.0    Appointment in past 6 or next 3 months        #30 sent. [employer]art sent to patient to complete labs.

## 2023-08-28 NOTE — TELEPHONE ENCOUNTER
Name from pharmacy: [facility] [address]         Will file in chart as: Tish Sage 100-5 MCG/ACT Inhalation Aerosol     Possible duplicate: Sherron to review recent actions on this medication    Sig: INHALE 2 PUFFS BY MOUTH INTO THE LUNGS IN THE MORNING AND 2 PUFFS BEFORE BEDTIME    Disp: 8.8 g    Refills: 0 (Pharmacy requested: Not specified)    Start: 8/24/2023    Class: Normal    For: Mild intermittent asthma without complication    Last ordered: 1 month ago by Mika Sloan DO Last refill: 7/3/2023    Rx #: [id number]    Prior authorization request will be sent when the order is signed.     Asthma & COPD Medication Protocol [id number]/24/2023 12:20 PM    Asthma Action Score greater than or equal to 20    Appointment in past 6 or next 3 months    AAP/ACT given in last 12 months      To be filled at: [address]/ [address], [phone], [phone]

## 2023-08-29 RX ORDER — LEVOTHYROXINE SODIUM 0.07 MG/1
TABLET ORAL
Qty: 30 TABLET | Refills: 0 | OUTPATIENT
Start: 2023-08-29

## 2023-08-29 RX ORDER — LEVOTHYROXINE SODIUM 0.07 MG/1
TABLET ORAL
Qty: 30 TABLET | Refills: 0 | Status: SHIPPED | OUTPATIENT
Start: 2023-08-29

## 2023-08-31 DIAGNOSIS — J45.20 MILD INTERMITTENT ASTHMA WITHOUT COMPLICATION: ICD-10-CM

## 2023-08-31 RX ORDER — MOMETASONE FUROATE AND FORMOTEROL FUMARATE DIHYDRATE 100; 5 UG/1; UG/1
1 AEROSOL RESPIRATORY (INHALATION) 2 TIMES DAILY
Qty: 8.8 G | Refills: 0 | Status: SHIPPED | OUTPATIENT
Start: 2023-08-31

## 2023-10-02 RX ORDER — BUPROPION HYDROCHLORIDE 150 MG/1
150 TABLET ORAL DAILY
Qty: 90 TABLET | Refills: 3 | Status: SHIPPED | OUTPATIENT
Start: 2023-10-02

## 2023-10-02 NOTE — TELEPHONE ENCOUNTER
Requesting Bupropion 150mg  LOV: 5/4/23 Physical  RTC: 1 year  Last Relevant Labs: 2/21/22  Filled: 5/4/23 #90 with 0 refills    No future appointments.     Non-protocol med:  Rx pended and routed for approval/denial

## 2023-10-07 ENCOUNTER — LAB ENCOUNTER (OUTPATIENT)
Dept: LAB | Age: 58
End: 2023-10-07
Attending: FAMILY MEDICINE
Payer: COMMERCIAL

## 2023-10-07 DIAGNOSIS — Z00.00 LABORATORY EXAMINATION ORDERED AS PART OF A ROUTINE GENERAL MEDICAL EXAMINATION: ICD-10-CM

## 2023-10-07 LAB
ALBUMIN SERPL-MCNC: 3.5 G/DL (ref 3.4–5)
ALBUMIN/GLOB SERPL: 1 {RATIO} (ref 1–2)
ALP LIVER SERPL-CCNC: 115 U/L
ALT SERPL-CCNC: 24 U/L
ANION GAP SERPL CALC-SCNC: 5 MMOL/L (ref 0–18)
AST SERPL-CCNC: 13 U/L (ref 15–37)
BASOPHILS # BLD AUTO: 0.05 X10(3) UL (ref 0–0.2)
BASOPHILS NFR BLD AUTO: 0.8 %
BILIRUB SERPL-MCNC: 0.6 MG/DL (ref 0.1–2)
BUN BLD-MCNC: 16 MG/DL (ref 7–18)
CALCIUM BLD-MCNC: 9 MG/DL (ref 8.5–10.1)
CHLORIDE SERPL-SCNC: 108 MMOL/L (ref 98–112)
CHOLEST SERPL-MCNC: 218 MG/DL (ref ?–200)
CO2 SERPL-SCNC: 28 MMOL/L (ref 21–32)
CREAT BLD-MCNC: 1.08 MG/DL
EGFRCR SERPLBLD CKD-EPI 2021: 60 ML/MIN/1.73M2 (ref 60–?)
EOSINOPHIL # BLD AUTO: 0.3 X10(3) UL (ref 0–0.7)
EOSINOPHIL NFR BLD AUTO: 4.7 %
ERYTHROCYTE [DISTWIDTH] IN BLOOD BY AUTOMATED COUNT: 14 %
FASTING PATIENT LIPID ANSWER: YES
FASTING STATUS PATIENT QL REPORTED: YES
GLOBULIN PLAS-MCNC: 3.6 G/DL (ref 2.8–4.4)
GLUCOSE BLD-MCNC: 114 MG/DL (ref 70–99)
HCT VFR BLD AUTO: 44 %
HDLC SERPL-MCNC: 39 MG/DL (ref 40–59)
HGB BLD-MCNC: 14 G/DL
IMM GRANULOCYTES # BLD AUTO: 0.03 X10(3) UL (ref 0–1)
IMM GRANULOCYTES NFR BLD: 0.5 %
LDLC SERPL CALC-MCNC: 131 MG/DL (ref ?–100)
LYMPHOCYTES # BLD AUTO: 1.54 X10(3) UL (ref 1–4)
LYMPHOCYTES NFR BLD AUTO: 23.9 %
MCH RBC QN AUTO: 26.9 PG (ref 26–34)
MCHC RBC AUTO-ENTMCNC: 31.8 G/DL (ref 31–37)
MCV RBC AUTO: 84.6 FL
MONOCYTES # BLD AUTO: 0.36 X10(3) UL (ref 0.1–1)
MONOCYTES NFR BLD AUTO: 5.6 %
NEUTROPHILS # BLD AUTO: 4.16 X10 (3) UL (ref 1.5–7.7)
NEUTROPHILS # BLD AUTO: 4.16 X10(3) UL (ref 1.5–7.7)
NEUTROPHILS NFR BLD AUTO: 64.5 %
NONHDLC SERPL-MCNC: 179 MG/DL (ref ?–130)
OSMOLALITY SERPL CALC.SUM OF ELEC: 294 MOSM/KG (ref 275–295)
PLATELET # BLD AUTO: 313 10(3)UL (ref 150–450)
POTASSIUM SERPL-SCNC: 4.1 MMOL/L (ref 3.5–5.1)
PROT SERPL-MCNC: 7.1 G/DL (ref 6.4–8.2)
RBC # BLD AUTO: 5.2 X10(6)UL
SODIUM SERPL-SCNC: 141 MMOL/L (ref 136–145)
TRIGL SERPL-MCNC: 266 MG/DL (ref 30–149)
TSI SER-ACNC: 1.97 MIU/ML (ref 0.36–3.74)
VLDLC SERPL CALC-MCNC: 49 MG/DL (ref 0–30)
WBC # BLD AUTO: 6.4 X10(3) UL (ref 4–11)

## 2023-10-07 PROCEDURE — 80050 GENERAL HEALTH PANEL: CPT | Performed by: FAMILY MEDICINE

## 2023-10-07 PROCEDURE — 80061 LIPID PANEL: CPT | Performed by: FAMILY MEDICINE

## 2023-10-09 ENCOUNTER — OFFICE VISIT (OUTPATIENT)
Dept: FAMILY MEDICINE CLINIC | Facility: CLINIC | Age: 58
End: 2023-10-09
Payer: COMMERCIAL

## 2023-10-09 VITALS
HEIGHT: 61 IN | OXYGEN SATURATION: 97 % | DIASTOLIC BLOOD PRESSURE: 72 MMHG | SYSTOLIC BLOOD PRESSURE: 110 MMHG | BODY MASS INDEX: 40.59 KG/M2 | RESPIRATION RATE: 16 BRPM | WEIGHT: 215 LBS | TEMPERATURE: 99 F | HEART RATE: 69 BPM

## 2023-10-09 DIAGNOSIS — M51.26 LUMBAR DISC HERNIATION: ICD-10-CM

## 2023-10-09 DIAGNOSIS — M54.50 ACUTE MIDLINE LOW BACK PAIN WITHOUT SCIATICA: Primary | ICD-10-CM

## 2023-10-09 RX ORDER — CYCLOBENZAPRINE HCL 10 MG
10 TABLET ORAL NIGHTLY PRN
Qty: 15 TABLET | Refills: 0 | Status: SHIPPED | OUTPATIENT
Start: 2023-10-09

## 2023-10-09 RX ORDER — METHYLPREDNISOLONE 4 MG/1
TABLET ORAL
Qty: 21 EACH | Refills: 0 | Status: SHIPPED | OUTPATIENT
Start: 2023-10-09

## 2023-10-09 RX ORDER — NAPROXEN 500 MG/1
500 TABLET ORAL 2 TIMES DAILY WITH MEALS
Qty: 20 TABLET | Refills: 0 | Status: SHIPPED | OUTPATIENT
Start: 2023-10-09 | End: 2023-10-19

## 2023-10-18 DIAGNOSIS — E03.9 ACQUIRED HYPOTHYROIDISM: ICD-10-CM

## 2023-10-19 RX ORDER — LEVOTHYROXINE SODIUM 0.07 MG/1
TABLET ORAL
Qty: 90 TABLET | Refills: 3 | Status: SHIPPED | OUTPATIENT
Start: 2023-10-19

## 2023-10-19 NOTE — TELEPHONE ENCOUNTER
Medication(s) to Refill:   Requested Prescriptions     Pending Prescriptions Disp Refills    levothyroxine 75 MCG Oral Tab [Pharmacy Med Name: LEVOTHYROXINE 0.075MG (75MCG) TABS] 30 tablet 0     Sig: TAKE 1 TABLET BY MOUTH EVERY DAY BEFORE BREAKFAST               Last Time Medication was Filled:  8/29/23      Last Office Visit with PCP: [id number]  When Patient was Due Back to the Office: (from when PCP last addressed condition)    Future Appointments:  No future appointments.       Last Blood Pressures:  BP Readings from Last 2 Encounters:  10/09/23 : 110/72  05/04/23 : 132/80        Recent Labs:  Free T4 (ng/dL)   Date Value   01/15/2019 1.2     TSH   Date Value   10/07/2023 1.970 mIU/mL   11/01/2017 1.27 mIU/L         Action taken:  [] Refill approved per protocol  [] Routing to provider for approval

## 2023-10-21 ENCOUNTER — HOSPITAL ENCOUNTER (OUTPATIENT)
Dept: GENERAL RADIOLOGY | Age: 58
Discharge: HOME | End: 2023-10-21
Attending: FAMILY MEDICINE

## 2023-10-21 DIAGNOSIS — M51.26 LUMBAR DISC HERNIATION: ICD-10-CM

## 2023-10-21 DIAGNOSIS — M54.50 ACUTE MIDLINE LOW BACK PAIN WITHOUT SCIATICA: ICD-10-CM

## 2023-10-21 PROCEDURE — 72100 X-RAY EXAM L-S SPINE 2/3 VWS: CPT | Performed by: FAMILY MEDICINE

## 2023-11-29 RX ORDER — AMLODIPINE BESYLATE 5 MG/1
5 TABLET ORAL DAILY
Qty: 30 TABLET | Refills: 11 | Status: SHIPPED | OUTPATIENT
Start: 2023-11-29

## 2023-11-29 NOTE — TELEPHONE ENCOUNTER
Medication(s) to Refill:   Requested Prescriptions     Pending Prescriptions Disp Refills    AMLODIPINE 5 MG Oral Tab [Pharmacy Med Name: AMLODIPINE BESYLATE TABS 5MG] 30 tablet 11     Sig: TAKE 1 TABLET DAILY             Last Time Medication was Filled:  8.28.23      Last Office Visit with PCP: 10.9.23    When Patient was Due Back to the Office:      (from when PCP last addressed condition)    Future Appointments:  No future appointments.      Last Blood Pressures:  BP Readings from Last 2 Encounters:   10/09/23 110/72   05/04/23 132/80         Recent Labs: 10.07.23        Action taken:  [x] Refill approved per protocol  [] Routing to provider for approval

## 2023-12-15 ENCOUNTER — APPOINTMENT (OUTPATIENT)
Dept: GENERAL RADIOLOGY | Age: 58
End: 2023-12-15
Attending: EMERGENCY MEDICINE
Payer: COMMERCIAL

## 2023-12-15 ENCOUNTER — HOSPITAL ENCOUNTER (OUTPATIENT)
Age: 58
Discharge: HOME | End: 2023-12-15
Attending: EMERGENCY MEDICINE
Payer: COMMERCIAL

## 2023-12-15 VITALS
OXYGEN SATURATION: 97 % | DIASTOLIC BLOOD PRESSURE: 77 MMHG | WEIGHT: 200 LBS | HEIGHT: 61 IN | RESPIRATION RATE: 16 BRPM | HEART RATE: 74 BPM | BODY MASS INDEX: 37.76 KG/M2 | TEMPERATURE: 99 F | SYSTOLIC BLOOD PRESSURE: 137 MMHG

## 2023-12-15 DIAGNOSIS — U07.1 COVID-19: Primary | ICD-10-CM

## 2023-12-15 DIAGNOSIS — J45.901 MILD ASTHMA WITH EXACERBATION, UNSPECIFIED WHETHER PERSISTENT: ICD-10-CM

## 2023-12-15 LAB
POCT INFLUENZA A: NEGATIVE
POCT INFLUENZA B: NEGATIVE
SARS-COV-2 RNA RESP QL NAA+PROBE: DETECTED

## 2023-12-15 PROCEDURE — 87502 INFLUENZA DNA AMP PROBE: CPT | Performed by: EMERGENCY MEDICINE

## 2023-12-15 PROCEDURE — 99204 OFFICE O/P NEW MOD 45 MIN: CPT

## 2023-12-15 PROCEDURE — 99213 OFFICE O/P EST LOW 20 MIN: CPT

## 2023-12-15 PROCEDURE — 94664 DEMO&/EVAL PT USE INHALER: CPT

## 2023-12-15 RX ORDER — ALBUTEROL SULFATE 90 UG/1
2 AEROSOL, METERED RESPIRATORY (INHALATION) 4 TIMES DAILY
Status: DISCONTINUED | OUTPATIENT
Start: 2023-12-15 | End: 2023-12-15

## 2023-12-15 RX ORDER — PREDNISONE 20 MG/1
40 TABLET ORAL DAILY
Qty: 10 TABLET | Refills: 0 | Status: SHIPPED | OUTPATIENT
Start: 2023-12-15 | End: 2023-12-20

## 2023-12-29 ENCOUNTER — TELEPHONE (OUTPATIENT)
Dept: FAMILY MEDICINE CLINIC | Facility: CLINIC | Age: 58
End: 2023-12-29

## 2023-12-29 DIAGNOSIS — J45.20 MILD INTERMITTENT ASTHMA WITHOUT COMPLICATION: ICD-10-CM

## 2023-12-29 RX ORDER — MOMETASONE FUROATE AND FORMOTEROL FUMARATE DIHYDRATE 100; 5 UG/1; UG/1
1 AEROSOL RESPIRATORY (INHALATION) 2 TIMES DAILY
Qty: 8.8 G | Refills: 0 | Status: SHIPPED | OUTPATIENT
Start: 2023-12-29

## 2023-12-29 NOTE — TELEPHONE ENCOUNTER
Patient requesting Dana Byrnes, only has her rescue inhaler, looks like it was discontinued though. She states otherwise.

## 2023-12-31 ENCOUNTER — HOSPITAL ENCOUNTER (OUTPATIENT)
Age: 58
Discharge: HOME | End: 2023-12-31
Payer: COMMERCIAL

## 2023-12-31 ENCOUNTER — APPOINTMENT (OUTPATIENT)
Dept: GENERAL RADIOLOGY | Age: 58
End: 2023-12-31
Attending: NURSE PRACTITIONER
Payer: COMMERCIAL

## 2023-12-31 VITALS
BODY MASS INDEX: 37.76 KG/M2 | HEART RATE: 70 BPM | HEIGHT: 61 IN | OXYGEN SATURATION: 98 % | TEMPERATURE: 98 F | SYSTOLIC BLOOD PRESSURE: 151 MMHG | DIASTOLIC BLOOD PRESSURE: 88 MMHG | WEIGHT: 200 LBS | RESPIRATION RATE: 16 BRPM

## 2023-12-31 DIAGNOSIS — R05.8 POST-VIRAL COUGH SYNDROME: Primary | ICD-10-CM

## 2023-12-31 PROCEDURE — 71046 X-RAY EXAM CHEST 2 VIEWS: CPT | Performed by: NURSE PRACTITIONER

## 2023-12-31 PROCEDURE — 99214 OFFICE O/P EST MOD 30 MIN: CPT

## 2023-12-31 PROCEDURE — 99213 OFFICE O/P EST LOW 20 MIN: CPT

## 2023-12-31 RX ORDER — PREDNISONE 20 MG/1
20 TABLET ORAL 2 TIMES DAILY
Qty: 10 TABLET | Refills: 0 | Status: SHIPPED | OUTPATIENT
Start: 2023-12-31 | End: 2024-01-05

## 2023-12-31 RX ORDER — BENZONATATE 100 MG/1
100 CAPSULE ORAL 3 TIMES DAILY PRN
Qty: 30 CAPSULE | Refills: 0 | Status: SHIPPED | OUTPATIENT
Start: 2023-12-31 | End: 2024-01-30

## 2023-12-31 RX ORDER — DEXTROMETHORPHAN HYDROBROMIDE AND PROMETHAZINE HYDROCHLORIDE 15; 6.25 MG/5ML; MG/5ML
5 SYRUP ORAL NIGHTLY
Qty: 35 ML | Refills: 0 | Status: SHIPPED | OUTPATIENT
Start: 2023-12-31 | End: 2024-01-07

## 2023-12-31 RX ORDER — ALBUTEROL SULFATE 90 UG/1
2 AEROSOL, METERED RESPIRATORY (INHALATION) EVERY 4 HOURS PRN
Qty: 1 EACH | Refills: 0 | Status: SHIPPED | OUTPATIENT
Start: 2023-12-31 | End: 2024-01-30

## 2023-12-31 NOTE — ED INITIAL ASSESSMENT (HPI)
Shortness of breath-  pt tested + covid. Shortness of breath x 2 weeks today is worse. pt has been using her inhaler.  Last dose this morning

## 2023-12-31 NOTE — DISCHARGE INSTRUCTIONS
Follow-up with your primary care physician in one week if symptoms have not improved or symptoms are starting to get worse. Increase fluids, keep well-hydrated. Take Tylenol and Motrin for fever and pain.   Take the [facility] for the cough use inhaler as needed take the steroids twice a day for 5 days  Return to the emergency room for with symptoms concerns  Use the promethazine at night to help with sleep

## 2024-01-14 ENCOUNTER — PATIENT MESSAGE (OUTPATIENT)
Dept: FAMILY MEDICINE CLINIC | Facility: CLINIC | Age: 59
End: 2024-01-14

## 2024-01-14 DIAGNOSIS — J45.20 MILD INTERMITTENT ASTHMA WITHOUT COMPLICATION: ICD-10-CM

## 2024-01-16 RX ORDER — MOMETASONE FUROATE AND FORMOTEROL FUMARATE DIHYDRATE 100; 5 UG/1; UG/1
1 AEROSOL RESPIRATORY (INHALATION) 2 TIMES DAILY
Qty: 8.8 G | Refills: 0 | Status: SHIPPED | OUTPATIENT
Start: 2024-01-16

## 2024-01-16 NOTE — TELEPHONE ENCOUNTER
From: Rosario Smith  To: Edwina Vicotrammed  Sent: 1/14/2024 11:29 AM CST  Subject: Dulera requested 12-29-23 no refill yet    Hello,  I requested a refill for Dulera inhaler on 12-29-23 as I had run out of refills and had test pos for Covid mid Dec.. I requested Doctor contact [facility] to get refill, insurance refused. I also mentioned [facility] as a possible refill. I was getting over Covid and having asmatic issues and it is now over 2 weeks and no one has refilled   this prescription request. PLEASE HELP... this Covid cough is lingering I need Dulera refilled with [facility] ASAP

## 2024-01-17 NOTE — TELEPHONE ENCOUNTER
From: Rosario Smith  To: Edwina Agudelo  Sent: 1/14/2024 11:34 AM CST  Subject: Dermatologist recommendation    Hello,  Can you recommend a Dermatologist out of [facility]? I have a scab on my face that will not heal. Thank you

## 2024-01-19 ENCOUNTER — TELEPHONE (OUTPATIENT)
Dept: FAMILY MEDICINE CLINIC | Facility: CLINIC | Age: 59
End: 2024-01-19

## 2024-01-19 DIAGNOSIS — J45.20 MILD INTERMITTENT ASTHMA WITHOUT COMPLICATION: ICD-10-CM

## 2024-01-19 RX ORDER — MOMETASONE FUROATE AND FORMOTEROL FUMARATE DIHYDRATE 100; 5 UG/1; UG/1
AEROSOL RESPIRATORY (INHALATION)
Qty: 8.8 G | Refills: 1 | Status: SHIPPED | OUTPATIENT
Start: 2024-01-19 | End: 2024-01-22

## 2024-01-22 ENCOUNTER — TELEPHONE (OUTPATIENT)
Dept: FAMILY MEDICINE CLINIC | Facility: CLINIC | Age: 59
End: 2024-01-22

## 2024-01-22 DIAGNOSIS — J45.20 MILD INTERMITTENT ASTHMA WITHOUT COMPLICATION: ICD-10-CM

## 2024-01-22 RX ORDER — MOMETASONE FUROATE AND FORMOTEROL FUMARATE DIHYDRATE 100; 5 UG/1; UG/1
AEROSOL RESPIRATORY (INHALATION)
Qty: 13 G | Refills: 1 | Status: SHIPPED | OUTPATIENT
Start: 2024-01-22

## 2024-01-22 NOTE — TELEPHONE ENCOUNTER
Pt still has a cough from her Covid. Pt was given steroids from UC and was given steroids. Pt would like to know if she can have a refill on the steroids-they really helped.

## 2024-03-02 ENCOUNTER — E-VISIT (OUTPATIENT)
Dept: TELEHEALTH | Age: 59
End: 2024-03-02
Payer: COMMERCIAL

## 2024-03-02 ENCOUNTER — TELEMEDICINE (OUTPATIENT)
Dept: TELEHEALTH | Age: 59
End: 2024-03-02
Payer: COMMERCIAL

## 2024-03-02 DIAGNOSIS — B96.89 ACUTE BACTERIAL RHINOSINUSITIS: Primary | ICD-10-CM

## 2024-03-02 DIAGNOSIS — Z02.9 ADMINISTRATIVE ENCOUNTER: Primary | ICD-10-CM

## 2024-03-02 DIAGNOSIS — J01.90 ACUTE BACTERIAL RHINOSINUSITIS: Primary | ICD-10-CM

## 2024-03-02 RX ORDER — DOXYCYCLINE HYCLATE 100 MG/1
100 CAPSULE ORAL 2 TIMES DAILY
Qty: 14 CAPSULE | Refills: 0 | Status: SHIPPED | OUTPATIENT
Start: 2024-03-02 | End: 2024-03-09

## 2024-03-02 NOTE — PROGRESS NOTES
Pt submitted video visit for sinus symptoms.  Pt's audio not working after multiple attempts.   Contacted pt by phone.  Pt will submit evisit instead.   No video visit charge.

## 2024-03-02 NOTE — PROGRESS NOTES
Rosario Smith is a 58 year old female submitting e-visit for sinus symptoms.  HPI:   See answers to questionnaire and [employer] message exchange  Pt submitted video visit but pt's audio did not work.  Contacted by phone and spoke to pt regarding sx and advised to submit evisit instead.  Reports sx x 6 days with worsening intense sinus pain/pressure of rt side of face/forehead.  Reports rt ear pressure.   Reports no nasal discharge - feels extremely congested and can't blow anything out.   Developed vertigo on 2/29/24 which has improved.  Describes as room spinning sensation that occurs with rapid head movements.  Reports gets vertigo 2-3x/year.  Previously saw neurology and went to [facility] for treatment.  Reports gets vertigo when she has ear/sinus symptoms.  Taking dramamine if needed for vertigo.  Tried Epley maneuver yesterday with mild improvement.  Doesn't feel safe to drive.   Home COVID neg  Pt had COVID in Dec 2023.       Current Outpatient Medications   Medication Sig Dispense Refill    Mometasone Furo-Formoterol Fum (DULERA) 100-5 MCG/ACT Inhalation Aerosol INHALE 2 PUFFS BY MOUTH INTO THE LUNGS IN THE MORNING AND 2 PUFFS BEFORE BEDTIME 13 g 1    Mometasone Furo-Formoterol Fum (DULERA) 100-5 MCG/ACT Inhalation Aerosol Inhale 1 puff into the lungs in the morning and 1 puff before bedtime. 8.8 g 0    AMLODIPINE 5 MG Oral Tab TAKE 1 TABLET DAILY 30 tablet 11    levothyroxine 75 MCG Oral Tab TAKE 1 TABLET BY MOUTH EVERY DAY BEFORE BREAKFAST 90 tablet 3    methylPREDNISolone (MEDROL) 4 MG Oral Tablet Therapy Pack As directed. 21 each 0    cyclobenzaprine 10 MG Oral Tab Take 1 tablet (10 mg total) by mouth nightly as needed for Muscle spasms. 15 tablet 0    buPROPion  MG Oral Tablet 24 Hr Take 1 tablet (150 mg total) by mouth daily. 90 tablet 3    ALPRAZolam (XANAX) 0.25 MG Oral Tab Take 1 tablet (0.25 mg total) by mouth daily as needed for Anxiety. (Patient not taking: Reported on 12/31/2023) 30  tablet 0    sertraline 50 MG Oral Tab Take 1 tablet (50 mg total) by mouth daily.      Albuterol Sulfate HFA (PROAIR HFA) 108 (90 Base) MCG/ACT Inhalation Aero Soln Inhale 2 puffs into the lungs every 6 (six) hours as needed for Wheezing. 3 Inhaler 3    Loratadine 10 MG Oral Cap Take  by mouth.        Past Medical History:   Diagnosis Date    Abdominal hernia 2004    Acute maxillary sinusitis     Acute pharyngitis     Acute URI     Allergic reaction     Allergic rhinitis     Anxiety disorder     Asthma (HCC)     Body piercing     BV (bacterial vaginosis)     Cellulitis of scalp     Cervical polyp     Chronic asthma (HCC)     Contact dermatitis     Cough     acute    DDD (degenerative disc disease), lumbar     Decorative tattoo     Depression     Dermatitis     Disc herniation     Left L5-S1    Dyslipidemia     Dyslipidemia     Elevated BP     Globus sensation     Gluteal pain     left    Hand, foot and mouth disease     History of depression     Hypercholesterolemia     Hypocalcemia     Hypothyroidism     Lipid screening 12/21/10    Low back pain     axial    Lymphadenopathy     Macrocytic anemia     MVP (mitral valve prolapse)     Nasal turbinate hypertrophy     OM (otitis media), acute     b/l    ISMAEL (obstructive sleep apnea) 6/15/21 [facility]    AHI 7 REM AHI 7 Supine AHI 10 non-supine AHI 5 [place]ao2 [place] 83%     Otalgia     left    Otitis externa     right and left    Palpitations     chest    Paresthesia     Pruritus     Radiculopathy     Left S1     Sciatica     Scoliosis     minimal    Seasonal allergies     Stress     Stress reaction     Stress reaction     Thoracic spondylosis     Tinea pedis     Vaginal candidiasis     Varicose vein     Vertigo     Wears glasses     Weight loss     Xerosis of skin       Past Surgical History:   Procedure Laterality Date    BACK SURGERY      COLONOSCOPY      HERNIA SURGERY      Umbilical hernia    BRIAN BIOPSY STEREO NODULE 1 SITE RIGHT (CPT=19081)  2015    [facility]    ORAL SURGERY  PROCEDURE      [place] tooth removal      Family History   Problem Relation Age of Onset    Diabetes Father         \"border.\"    High Blood Pressure Father     Diabetes Mother         \"border.\"    High Blood Pressure Mother     Depression Mother     Psychiatric Mother         binge eating    ADHD Brother     Breast Cancer Paternal Aunt 49    Other (COPD) Other     Depression Daughter     Bipolar Disorder Daughter     Suicide History Daughter         attempt    Depression Sister     OCD Sister         undiagnosed    ADHD Sister     Depression Daughter       Social History:  Social History     Socioeconomic History    Marital status: Single   Tobacco Use    Smoking status: Never    Smokeless tobacco: Never   Vaping Use    Vaping Use: Never used   Substance and Sexual Activity    Alcohol use: Yes     Comment: social    Drug use: No    Sexual activity: Yes     Partners: Male   Other Topics Concern    Caffeine Concern Yes     Comment: 2 cups of coffee daily    Exercise Yes     Comment: goes to health club 4x per week         ASSESSMENT AND PLAN:       Diagnoses and all orders for this visit:    Acute bacterial rhinosinusitis  -     doxycycline 100 MG Oral Cap; Take 1 capsule (100 mg total) by mouth 2 (two) times daily for 7 days.      Will treat with medication as listed.  Info provided on use, dose, and possible side effects  Supportive measures  Patient advised to follow up with PCP if no improvement or worsening of symptoms  Refer to [employer] exchange for specific patient instructions    Duration of  the service:  15 minutes

## 2024-03-05 ENCOUNTER — TELEPHONE (OUTPATIENT)
Dept: FAMILY MEDICINE CLINIC | Facility: CLINIC | Age: 59
End: 2024-03-05

## 2024-03-05 ENCOUNTER — TELEMEDICINE (OUTPATIENT)
Dept: FAMILY MEDICINE CLINIC | Facility: CLINIC | Age: 59
End: 2024-03-05
Payer: COMMERCIAL

## 2024-03-05 DIAGNOSIS — H81.13 BENIGN PAROXYSMAL POSITIONAL VERTIGO DUE TO BILATERAL VESTIBULAR DISORDER: Primary | ICD-10-CM

## 2024-03-05 PROCEDURE — 99213 OFFICE O/P EST LOW 20 MIN: CPT | Performed by: FAMILY MEDICINE

## 2024-03-05 RX ORDER — METHYLPREDNISOLONE 4 MG/1
TABLET ORAL
Qty: 21 EACH | Refills: 0 | Status: SHIPPED | OUTPATIENT
Start: 2024-03-05

## 2024-03-05 NOTE — TELEPHONE ENCOUNTER
Patient says she has had vertigo for 6 days, did a VV on line and thought maybe it was a sinus infection, has been on antibiotic for 48 hours now, can't drive due to it, has never lasted this long before. Looking for direction, declined a visit. Would like a call back.

## 2024-03-05 NOTE — PROGRESS NOTES
Subjective    This visit is conducted using Telemedicine with live, interactive video and audio.    Chief Complaint:  Chief Complaint   Patient presents with    Dizziness                The patient confirmed knowledge of the limitations of the use of telemedicine were verbally confirmed by the provider.  Verification of patient identity was established.  Patient understands and accepts financial responsibility for any deductible, co-insurance and/or co-pays associated with this service.        HPI:  Patient complains of vertigo for the last 4 days. She has experienced gait unsteadiness and nausea. She took dramamine and ibuprofen. The dizziness was associated with headache. She reports gait abnormality. Denies numbness, tingling, weakness. She has been sleeping sitting up. She has hx of vertigo. This is now day 6.  She did some home exercises for vertigo/epley's and that did not help. She has hx of vertigo- [facility] for this in the past.   She also has some mild sinus pressure above her eyes this has been going on for few days.   At present her headache has improved but still present- the h/a is in the \"neck area\"   Also reports that at the back of her neck she felt some swollen lymph nodes.   She did an e-visit and was given doxycycline. It has not helped.   Denies numbness, tingling, weakness, visual loss/disturbance.     Review of Systems   Constitutional: Negative for fever, chills and fatigue. No distress.  HENT: Negative for hearing loss, congestion, sore throat  Respiratory: Negative for cough, chest tightness, shortness of breath and wheezing.    Cardiovascular: Negative for chest pain, palpitations and leg swelling.   Gastrointestinal: Negative for nausea, vomiting, abdominal pain, diarrhea  Neurological: Negative for  syncope, weakness, numbness, tingling and headaches.   Hematological: Negative for adenopathy. Does not bruise/bleed easily.   HISTORY:  Past Medical History:   Diagnosis Date     Abdominal hernia 2004    Acute maxillary sinusitis     Acute pharyngitis     Acute URI     Allergic reaction     Allergic rhinitis     Anxiety disorder     Asthma (HCC)     Body piercing     BV (bacterial vaginosis)     Cellulitis of scalp     Cervical polyp     Chronic asthma (HCC)     Contact dermatitis     Cough     acute    DDD (degenerative disc disease), lumbar     Decorative tattoo     Depression     Dermatitis     Disc herniation     Left L5-S1    Dyslipidemia     Dyslipidemia     Elevated BP     Globus sensation     Gluteal pain     left    Hand, foot and mouth disease     History of depression     Hypercholesterolemia     Hypocalcemia     Hypothyroidism     Lipid screening 12/21/10    Low back pain     axial    Lymphadenopathy     Macrocytic anemia     MVP (mitral valve prolapse)     Nasal turbinate hypertrophy     OM (otitis media), acute     b/l    ISMAEL (obstructive sleep apnea) 6/15/21 [facility]    AHI 7 REM AHI 7 Supine AHI 10 non-supine AHI 5 Sao2 [place] 83%     Otalgia     left    Otitis externa     right and left    Palpitations     chest    Paresthesia     Pruritus     Radiculopathy     Left S1     Sciatica     Scoliosis     minimal    Seasonal allergies     Stress     Stress reaction     Stress reaction     Thoracic spondylosis     Tinea pedis     Vaginal candidiasis     Varicose vein     Vertigo     Wears glasses     Weight loss     Xerosis of skin       Past Surgical History:   Procedure Laterality Date    BACK SURGERY      COLONOSCOPY      HERNIA SURGERY      Umbilical hernia    BRIAN BIOPSY STEREO NODULE 1 SITE RIGHT (CPT=19081)  2015    [facility]    ORAL SURGERY PROCEDURE      [place] tooth removal      Family History   Problem Relation Age of Onset    Diabetes Father         \"border.\"    High Blood Pressure Father     Diabetes Mother         \"border.\"    High Blood Pressure Mother     Depression Mother     Psychiatric Mother         binge eating    ADHD Brother     Breast Cancer Paternal Aunt 49     Other (COPD) Other     Depression Daughter     Bipolar Disorder Daughter     Suicide History Daughter         attempt    Depression Sister     OCD Sister         undiagnosed    ADHD Sister     Depression Daughter       Social History     Socioeconomic History    Marital status: Single   Tobacco Use    Smoking status: Never    Smokeless tobacco: Never   Vaping Use    Vaping Use: Never used   Substance and Sexual Activity    Alcohol use: Yes     Comment: social    Drug use: No    Sexual activity: Yes     Partners: Male   Other Topics Concern    Caffeine Concern Yes     Comment: 2 cups of coffee daily    Exercise Yes     Comment: goes to health club 4x per week              Objective    Physical Exam:  alert, appears stated age, and cooperative, Speaking in full sentences comfortably, and Normal work of breathing  Respiratory effort: normal , No pursed-lip breathing, speaking in complete sentences  Neuro: Alert/oriented x3, speech is fluent, face symmetric  Psych: Mood is stable, Affect appropriate    Assessment/Plan:    1. Benign paroxysmal positional vertigo due to bilateral vestibular disorder  Discussed differential with the patient and dizziness.  For BPPV which she has had in the past, also discussed alarming symptoms to watch for which could be indicative of a stroke such as numbness tingling weakness worsening headache.  For now I have started her on a Medrol Dosepak for the sinus pressure.  Referred to ENT for vertigo.  Also referred her to see physical therapy for vestibular rehab.  If symptoms worsen or do not go away in the next couple of days she is to notify me.  May consider ordering an MRI.  - Physical Therapy Referral - [facility]  - methylPREDNISolone (MEDROL) 4 MG Oral Tablet Therapy Pack; As directed.  Dispense: 21 each; Refill: 0  - ENT Referral - In Network    Diagnostic rationale, follow up instructions, and strict precautions/indications for emergent direct evaluation were discussed with  the patient. The patient agrees with the plan, and understands to follow up with their primary care physician or other healthcare provider within 48-72 hours for reevaluation for persistent or worsening symptoms.    Patient understands phone evaluation is not a substitute for face-to-face examination or emergency care. Patient advised to go to ER or call 911 for worsening symptoms or acute distress.         Edwina Agudelo, DO

## 2024-03-19 RX ORDER — SERTRALINE HYDROCHLORIDE 100 MG/1
TABLET, FILM COATED ORAL
Qty: 90 TABLET | Refills: 5 | OUTPATIENT
Start: 2024-03-19

## 2024-03-19 NOTE — TELEPHONE ENCOUNTER
The original prescription was discontinued on 5/4/2023 by Sarah Gamboa MA for the following reason: Dose adjustment. Renewing this prescription may not be appropriate.

## 2024-04-01 ENCOUNTER — TELEMEDICINE (OUTPATIENT)
Dept: FAMILY MEDICINE CLINIC | Facility: CLINIC | Age: 59
End: 2024-04-01
Payer: COMMERCIAL

## 2024-04-01 DIAGNOSIS — J01.10 ACUTE NON-RECURRENT FRONTAL SINUSITIS: Primary | ICD-10-CM

## 2024-04-01 RX ORDER — CLINDAMYCIN HYDROCHLORIDE 300 MG/1
300 CAPSULE ORAL 3 TIMES DAILY
Qty: 30 CAPSULE | Refills: 0 | Status: SHIPPED | OUTPATIENT
Start: 2024-04-01 | End: 2024-04-11

## 2024-04-01 NOTE — PROGRESS NOTES
This is a telemedicine visit with live, interactive video and/or audio.     Patient understands and accepts financial responsibility for any deductible, co-insurance and/or co-pays associated with this service.    SUBJECTIVE  This is a 58 year old female presents for evaluation of a sinus pressure, congestion, & sinus pain.  Onset was about a month prior . The sinus pressure and congrestion has been on going and getting worse. Treatments tried OTC sinus medications. Thick mucus, sinus pressure and sinus pain. Denies current fevers, chills, lung disease, asthma, copd, weight loss,  smoking, heart disease. Sick contacts: Denies. Recent travel: Denies Exposure to TB: Denies.      Results for orders placed or performed during the hospital encounter of 12/15/23   POCT Flu Test    Specimen: Nares; Other   Result Value Ref Range    POCT INFLUENZA A Negative Negative    POCT INFLUENZA B Negative Negative   Rapid SARS-CoV-2 by PCR    Specimen: Nares; Other   Result Value Ref Range    Rapid SARS-CoV-2 by PCR Detected (A) Not Detected       HISTORY:  Past Medical History:   Diagnosis Date    Abdominal hernia 2004    Acute maxillary sinusitis     Acute pharyngitis     Acute URI     Allergic reaction     Allergic rhinitis     Anxiety disorder     Asthma (HCC)     Body piercing     BV (bacterial vaginosis)     Cellulitis of scalp     Cervical polyp     Chronic asthma (HCC)     Contact dermatitis     Cough     acute    DDD (degenerative disc disease), lumbar     Decorative tattoo     Depression     Dermatitis     Disc herniation     Left L5-S1    Dyslipidemia     Dyslipidemia     Elevated BP     Globus sensation     Gluteal pain     left    Hand, foot and mouth disease     History of depression     Hypercholesterolemia     Hypocalcemia     Hypothyroidism     Lipid screening 12/21/10    Low back pain     axial    Lymphadenopathy     Macrocytic anemia     MVP (mitral valve prolapse)     Nasal turbinate hypertrophy     OM (otitis  media), acute     b/l    ISMAEL (obstructive sleep apnea) 6/15/21 [facility]    AHI 7 REM AHI 7 Supine AHI 10 non-supine AHI 5 Sao2 [place] 83%     Otalgia     left    Otitis externa     right and left    Palpitations     chest    Paresthesia     Pruritus     Radiculopathy     Left S1     Sciatica     Scoliosis     minimal    Seasonal allergies     Stress     Stress reaction     Stress reaction     Thoracic spondylosis     Tinea pedis     Vaginal candidiasis     Varicose vein     Vertigo     Wears glasses     Weight loss     Xerosis of skin       Past Surgical History:   Procedure Laterality Date    BACK SURGERY      COLONOSCOPY      HERNIA SURGERY      Umbilical hernia    BRIAN BIOPSY STEREO NODULE 1 SITE RIGHT (CPT=19081)  2015    [facility]ya    ORAL SURGERY PROCEDURE      [place] tooth removal      Family History   Problem Relation Age of Onset    Diabetes Father         \"border.\"    High Blood Pressure Father     Diabetes Mother         \"border.\"    High Blood Pressure Mother     Depression Mother     Psychiatric Mother         binge eating    ADHD Brother     Breast Cancer Paternal Aunt 49    Other (COPD) Other     Depression Daughter     Bipolar Disorder Daughter     Suicide History Daughter         attempt    Depression Sister     OCD Sister         undiagnosed    ADHD Sister     Depression Daughter       Social History     Socioeconomic History    Marital status: Single   Tobacco Use    Smoking status: Never    Smokeless tobacco: Never   Vaping Use    Vaping Use: Never used   Substance and Sexual Activity    Alcohol use: Yes     Comment: social    Drug use: No    Sexual activity: Yes     Partners: Male   Other Topics Concern    Caffeine Concern Yes     Comment: 2 cups of coffee daily    Exercise Yes     Comment: goes to health club 4x per week        Allergies   Allergen Reactions    Kiwi Extract Tightness in Throat    Augmentin [Amoxicillin-Pot Clavulanate]      \"Funny feeling in throat\"      Cat Dander [Dander]     Dilaudid  [Hydromorphone Hcl]     Food      Kiwi Fruit        Current Outpatient Medications   Medication Sig Dispense Refill    clindamycin 300 MG Oral Cap Take 1 capsule (300 mg total) by mouth 3 (three) times daily for 10 days. 30 capsule 0    methylPREDNISolone (MEDROL) 4 MG Oral Tablet Therapy Pack As directed. 21 each 0    Mometasone Furo-Formoterol Fum (DULERA) 100-5 MCG/ACT Inhalation Aerosol INHALE 2 PUFFS BY MOUTH INTO THE LUNGS IN THE MORNING AND 2 PUFFS BEFORE BEDTIME 13 g 1    Mometasone Furo-Formoterol Fum (DULERA) 100-5 MCG/ACT Inhalation Aerosol Inhale 1 puff into the lungs in the morning and 1 puff before bedtime. 8.8 g 0    AMLODIPINE 5 MG Oral Tab TAKE 1 TABLET DAILY 30 tablet 11    levothyroxine 75 MCG Oral Tab TAKE 1 TABLET BY MOUTH EVERY DAY BEFORE BREAKFAST 90 tablet 3    methylPREDNISolone (MEDROL) 4 MG Oral Tablet Therapy Pack As directed. 21 each 0    cyclobenzaprine 10 MG Oral Tab Take 1 tablet (10 mg total) by mouth nightly as needed for Muscle spasms. 15 tablet 0    buPROPion  MG Oral Tablet 24 Hr Take 1 tablet (150 mg total) by mouth daily. 90 tablet 3    ALPRAZolam (XANAX) 0.25 MG Oral Tab Take 1 tablet (0.25 mg total) by mouth daily as needed for Anxiety. (Patient not taking: Reported on 12/31/2023) 30 tablet 0    sertraline 50 MG Oral Tab Take 1 tablet (50 mg total) by mouth daily.      Albuterol Sulfate HFA (PROAIR HFA) 108 (90 Base) MCG/ACT Inhalation Aero Soln Inhale 2 puffs into the lungs every 6 (six) hours as needed for Wheezing. 3 Inhaler 3    Loratadine 10 MG Oral Cap Take  by mouth.         OBJECTIVE  Physical Exam:   Speaking in full sentences comfortably and Normal work of breathing    ASSESSMENT & PLAN  Diagnoses and all orders for this visit:    Acute non-recurrent frontal sinusitis  -     clindamycin 300 MG Oral Cap; Take 1 capsule (300 mg total) by mouth 3 (three) times daily for 10 days.    Recommended increased fluids/humidity  12-hour decongestant nasal spray twice  daily for a maximum of 4 days  Steam inhalation for sinus pressure   OTC cold and flu medication as needed.   Robitussin or Robitussin DM as needed for cough  1 tsp honey for the cough prn    Tylenol as needed/Ibuprofen as needed, do not exceed 4000 mg of acetaminophen or 2400 mg of ibuprofen    Recheck if not improved in one week or sooner if worsening.      Follow Up:  As needed.      Plan discussed with patient. All questions answered. Patient is agreeable to this plan of care.     Time spent on telephone call and encounter 15 min.       ANAND Casey

## 2024-05-30 DIAGNOSIS — E03.9 ACQUIRED HYPOTHYROIDISM: ICD-10-CM

## 2024-05-30 DIAGNOSIS — R73.03 PREDIABETES: ICD-10-CM

## 2024-05-30 DIAGNOSIS — I10 PRIMARY HYPERTENSION: ICD-10-CM

## 2024-05-30 DIAGNOSIS — E78.5 DYSLIPIDEMIA: Primary | ICD-10-CM

## 2024-05-30 NOTE — TELEPHONE ENCOUNTER
Future Appointments   Date Time Provider Department Center   7/2/2024  7:30 AM Edwina Agudelo,  [facility] [address] [facility] [address]     Patient requesting levo, amlodipine, bupropion refills.

## 2024-06-04 ENCOUNTER — PATIENT MESSAGE (OUTPATIENT)
Dept: FAMILY MEDICINE CLINIC | Facility: CLINIC | Age: 59
End: 2024-06-04

## 2024-06-04 RX ORDER — LEVOTHYROXINE SODIUM 0.07 MG/1
TABLET ORAL
Qty: 90 TABLET | Refills: 0 | Status: SHIPPED | OUTPATIENT
Start: 2024-06-04

## 2024-06-04 RX ORDER — LEVOTHYROXINE SODIUM 0.07 MG/1
TABLET ORAL
Qty: 90 TABLET | Refills: 3 | OUTPATIENT
Start: 2024-06-04

## 2024-06-04 RX ORDER — BUPROPION HYDROCHLORIDE 150 MG/1
150 TABLET ORAL DAILY
Qty: 90 TABLET | Refills: 0 | Status: SHIPPED | OUTPATIENT
Start: 2024-06-04

## 2024-06-04 RX ORDER — BUPROPION HYDROCHLORIDE 150 MG/1
150 TABLET ORAL DAILY
Qty: 90 TABLET | Refills: 3 | OUTPATIENT
Start: 2024-06-04

## 2024-06-04 RX ORDER — AMLODIPINE BESYLATE 5 MG/1
5 TABLET ORAL DAILY
Qty: 30 TABLET | Refills: 11 | OUTPATIENT
Start: 2024-06-04

## 2024-06-04 RX ORDER — AMLODIPINE BESYLATE 5 MG/1
5 TABLET ORAL DAILY
Qty: 90 TABLET | Refills: 0 | Status: SHIPPED | OUTPATIENT
Start: 2024-06-04

## 2024-06-14 NOTE — TELEPHONE ENCOUNTER
From: Lidia VASQUEZ  To: Rosario Smith  Sent: 6/4/2024 12:03 PM CDT  Subject: refills    Hi Rosario,  Dr Agudelo refilled your medications. She also entered labs for you at [facility].  If you could try to get those done prior to your appointment.  You may call [phone] to schedule.  Thanks, Lidia

## 2024-07-02 ENCOUNTER — OFFICE VISIT (OUTPATIENT)
Dept: FAMILY MEDICINE CLINIC | Facility: CLINIC | Age: 59
End: 2024-07-02
Payer: COMMERCIAL

## 2024-07-02 VITALS
RESPIRATION RATE: 16 BRPM | WEIGHT: 217 LBS | HEART RATE: 73 BPM | DIASTOLIC BLOOD PRESSURE: 84 MMHG | SYSTOLIC BLOOD PRESSURE: 128 MMHG | OXYGEN SATURATION: 96 % | BODY MASS INDEX: 40.97 KG/M2 | HEIGHT: 61 IN | TEMPERATURE: 97 F

## 2024-07-02 DIAGNOSIS — G43.109 MIGRAINE WITH AURA AND WITHOUT STATUS MIGRAINOSUS, NOT INTRACTABLE: ICD-10-CM

## 2024-07-02 DIAGNOSIS — B35.3 TINEA PEDIS OF RIGHT FOOT: ICD-10-CM

## 2024-07-02 DIAGNOSIS — M51.36 DDD (DEGENERATIVE DISC DISEASE), LUMBAR: ICD-10-CM

## 2024-07-02 DIAGNOSIS — Z12.31 BREAST CANCER SCREENING BY MAMMOGRAM: ICD-10-CM

## 2024-07-02 DIAGNOSIS — D24.1 PAPILLOMA OF RIGHT BREAST: ICD-10-CM

## 2024-07-02 DIAGNOSIS — E03.9 ACQUIRED HYPOTHYROIDISM: ICD-10-CM

## 2024-07-02 DIAGNOSIS — E78.5 DYSLIPIDEMIA: ICD-10-CM

## 2024-07-02 DIAGNOSIS — J45.20 MILD INTERMITTENT ASTHMA WITHOUT COMPLICATION (HCC): ICD-10-CM

## 2024-07-02 DIAGNOSIS — Z00.00 WELL ADULT EXAM: Primary | ICD-10-CM

## 2024-07-02 DIAGNOSIS — G47.33 OSA ON CPAP: ICD-10-CM

## 2024-07-02 DIAGNOSIS — I34.1 MVP (MITRAL VALVE PROLAPSE): ICD-10-CM

## 2024-07-02 DIAGNOSIS — I10 PRIMARY HYPERTENSION: ICD-10-CM

## 2024-07-02 RX ORDER — TRIAMCINOLONE ACETONIDE 1 MG/G
CREAM TOPICAL 2 TIMES DAILY
Qty: 60 G | Refills: 0 | Status: SHIPPED | OUTPATIENT
Start: 2024-07-02 | End: 2024-07-09

## 2024-07-02 NOTE — PROGRESS NOTES
Chief Complaint   Patient presents with    Well Adult     Patient has labs ordered but has not gone yet.  Patient has pap done last week - signed release form  Declined Zoster and pneumococcal vaccine       HPI:  Patient who is here for her physical exam.    Anxiety: patient has hx of anxiety and is currently on bupropion 150mg daily.    She has been on this for 17 years.  She currently sees therapist.   She denies  fatigue, insomnia, decrease in appetite or weight loss, loss of interest in activities, feelings of sadness, crying spells, SI or HI. No hx of seizures.   Hx of  hpv last year. Pt had pap smear done last week- will request records.     Asthma -has a history of asthma and that is well controlled.  She is on albuterol inhaler as needed for shortness of breath or wheezing.  She is also on Dulara daily.  She has no issues with the medication.  No recent exacerbations.  A few years ago she was seen by Pulmonologist. She no longer follows with Pulm.     Hx of ISMAEL- using cpap. Advised to f/u with pulm.       Papilloma of right breast - in her right nipple diagnosed 8 years ago   Mammogram ordered. Birads 2 in 2023.       Hypothyroidism:   Currently on levothyroxine 75mcg for 5 days, she does admit that she stopped taking levothyroxine for several months. She has been on this dose for 20 years. There were some insurance issues.    She denies cold/heat intolerance, hair loss.. Does report that she was getting mood changes. Mood is better. No fmhx of thyroid CA. Reports weight gain.    Patient has itching, dryness at the base of her right foot. Denies hx of fungus/athlete's foot. Denies any wound. No change in shoes. Tried otc HC with minimal improvement.        Patient presents for follow-up of elevated blood pressure.   BP well controlled, she is on amlodipine 5 mg daily.   Smoking: none  Alcohol: rarely    Drugs: none   Sexual hx: 1 partner   STD hx: declined  Occupation:    Mammogram: due for  mammogram   Colonoscopy: completed in 2020- 10 year plan        Review of Systems   Constitutional: Negative for fever, chills and fatigue. No distress.  HENT: Negative for hearing loss, congestion, sore throat, neck pain and dental problem.    Eyes: Negative for pain and visual disturbance.   Respiratory: Negative for cough, chest tightness, shortness of breath and wheezing.    Cardiovascular: Negative for chest pain, palpitations and leg swelling.   Gastrointestinal: Negative for nausea, vomiting, abdominal pain, diarrhea, blood in stool and abdominal distention.   Genitourinary: Negative for dysuria, hematuria and difficulty urinating.     Patient Active Problem List   Diagnosis    Acquired hypothyroidism    Dyslipidemia    Depression    MVP (mitral valve prolapse)    DDD (degenerative disc disease), lumbar    Anxiety disorder    Papilloma of right breast    Fibrocystic breast changes    Duct ectasia of breast    Mild intermittent asthma without complication (HCC)    Obese    Seasonal allergies    Prediabetes    Migraine with aura and without status migrainosus, not intractable    Primary cough headache    Vertigo    Dizziness    HTN (hypertension)    ISMAEL on CPAP    History of COVID-19       Past Medical History:    Abdominal hernia    Acute maxillary sinusitis    Acute pharyngitis    Acute URI    Allergic reaction    Allergic rhinitis    Anxiety disorder    Asthma (HCC)    Body piercing    BV (bacterial vaginosis)    Cellulitis of scalp    Cervical polyp    Chronic asthma (HCC)    Contact dermatitis    Cough    acute    DDD (degenerative disc disease), lumbar    Decorative tattoo    Depression    Dermatitis    Disc herniation    Left L5-S1    Dyslipidemia    Dyslipidemia    Elevated BP    Essential hypertension    Globus sensation    Gluteal pain    left    Hand, foot and mouth disease    History of depression    Hypercholesterolemia    Hypocalcemia    Hypothyroidism    Lipid screening    Low back pain    axial     Lymphadenopathy    Macrocytic anemia    MVP (mitral valve prolapse)    Nasal turbinate hypertrophy    Obesity    OM (otitis media), acute    b/l    ISMAEL (obstructive sleep apnea)    AHI 7 REM AHI 7 Supine AHI 10 non-supine AHI 5 Sao2 [place] 83%     Otalgia    left    Otitis externa    right and left    Palpitations    chest    Paresthesia    Pruritus    Radiculopathy    Left S1     Sciatica    Scoliosis    minimal    Seasonal allergies    Sleep apnea    Stress    Stress reaction    Stress reaction    Thoracic spondylosis    Tinea pedis    Vaginal candidiasis    Varicose vein    Vertigo    Wears glasses    Weight loss    Xerosis of skin     Past Surgical History:   Procedure Laterality Date    Back surgery      Colonoscopy      Hernia surgery      Umbilical hernia    Giuliana biopsy stereo nodule 1 site right (cpt=19081)      aniya          Oral surgery procedure      [place] tooth removal     Family History   Problem Relation Age of Onset    Diabetes Father         \"border.\"    High Blood Pressure Father     Diabetes Mother         \"border.\"    High Blood Pressure Mother     Depression Mother     Psychiatric Mother         binge eating    ADHD Brother     Breast Cancer Paternal Aunt 49    Other (COPD) Other     Depression Daughter     Bipolar Disorder Daughter     Suicide History Daughter         attempt    Depression Sister     OCD Sister         undiagnosed    ADHD Sister     Depression Daughter      Social History     Socioeconomic History    Marital status: Single   Tobacco Use    Smoking status: Never     Passive exposure: Never    Smokeless tobacco: Never   Vaping Use    Vaping status: Never Used   Substance and Sexual Activity    Alcohol use: Not Currently     Comment: social    Drug use: No    Sexual activity: Yes     Partners: Male   Other Topics Concern    Caffeine Concern No    Stress Concern No    Weight Concern No    Special Diet No    Exercise No    Seat Belt Yes       Current Outpatient Medications    Medication Sig Dispense Refill    triamcinolone 0.1 % External Cream Apply topically 2 (two) times daily for 7 days. Do not use longer than 7 days in a row. Use on the base of your right foot. 60 g 0    amLODIPine 5 MG Oral Tab Take 1 tablet (5 mg total) by mouth daily. 90 tablet 0    buPROPion  MG Oral Tablet 24 Hr Take 1 tablet (150 mg total) by mouth daily. 90 tablet 0    levothyroxine 75 MCG Oral Tab TAKE 1 TABLET BY MOUTH EVERY DAY BEFORE BREAKFAST 90 tablet 0    Mometasone Furo-Formoterol Fum (DULERA) 100-5 MCG/ACT Inhalation Aerosol Inhale 1 puff into the lungs in the morning and 1 puff before bedtime. 8.8 g 0    ALPRAZolam (XANAX) 0.25 MG Oral Tab Take 1 tablet (0.25 mg total) by mouth daily as needed for Anxiety. (Patient not taking: Reported on 12/31/2023) 30 tablet 0    Albuterol Sulfate HFA (PROAIR HFA) 108 (90 Base) MCG/ACT Inhalation Aero Soln Inhale 2 puffs into the lungs every 6 (six) hours as needed for Wheezing. 3 Inhaler 3    Loratadine 10 MG Oral Cap Take  by mouth.         Allergies  Allergies   Allergen Reactions    Kiwi Extract Tightness in Throat    Augmentin [Amoxicillin-Pot Clavulanate]      \"Funny feeling in throat\"      Cat Dander [Dander]     Dilaudid [Hydromorphone Hcl]     Food      Kiwi Fruit         Health Maintenance  Immunizations:  Immunization History   Administered Date(s) Administered    FLULAVAL 6 months & older 0.5 ml Prefilled syringe (89045) 09/21/2020    FLUZONE 6 months and older PFS 0.5 ml (83495) 09/21/2020    Influenza 10/28/2013, 09/15/2016, 09/30/2017    Influenza(Afluria)0.5ml QIV PFS 09/23/2018    Pneumovax 23 03/16/2020    TDAP 05/20/2021   Deferred Date(s) Deferred    FLULAVAL 6 months & older 0.5 ml Prefilled syringe (78481) 10/09/2019, 03/06/2020         Physical Exam  /84   Pulse 73   Temp 97.3 °F (36.3 °C) (Temporal)   Resp 16   Ht 5' 1\" (1.549 m)   Wt 217 lb (98.4 kg)   SpO2 96%   BMI 41.00 kg/m²   Constitutional: Oriented to person,  place, and time. No distress.   HEENT:  Normocephalic and atraumatic. Hearing and tympanic membranes normal.  Nose normal. Oropharynx is clear and moist.   Eyes: Conjunctivae and EOM are normal. PERRLA. No scleral icterus.   Neck:  No mass and no thyromegaly present.   Cardiovascular: Normal rate, regular rhythm and intact distal pulses.  No murmur, rubs or gallops.   Pulmonary/Chest: Effort normal and breath sounds normal. No respiratory distress. No wheezes, rhonchi or rales  Abdominal: Soft. Bowel sounds are normal. Non tender, no masses, no organomegaly or hernias.  Musculoskeletal: Normal range of motion. No edema and no tenderness.   Skin: dry flaky peeling skin base of right foot almost entire foot. There is no erythema.   Psychiatric: Normal mood and affect.     A/P:    Encounter Diagnoses   Name Primary?    Breast cancer screening by mammogram     ISMAEL on CPAP     Papilloma of right breast     Migraine with aura and without status migrainosus, not intractable     DDD (degenerative disc disease), lumbar     MVP (mitral valve prolapse)     Primary hypertension     Dyslipidemia     Acquired hypothyroidism     Mild intermittent asthma without complication (HCC)     Well adult exam Yes       1. Breast cancer screening by mammogram  - [facility] SUBHASH 2D+3D SCREENING BILAT (CPT=77067/41828); Future    2. ISMAEL on CPAP  Continue cpap, see pulmonology     3. Papilloma of right breast  Mammogram due.     4. Migraine with aura and without status migrainosus, not intractable  Resolved.     5. DDD (degenerative disc disease), lumbar  Stable w/o pain medication.     6. MVP (mitral valve prolapse)  Stable, asymptomatic.     7. Primary hypertension  Last K was 4.1 done on 10/7/2023.  Last Cr was 1.08 done on 10/7/2023.  Last eGFR was 60 on 10/7/2023.  Continue on amlodipine 5 mg daily.     8. Dyslipidemia  Will check lipid panel.    9. Acquired hypothyroidism  Continue on levothyroxine, noncomplaint for couple months, she now  resumed it.     10. Mild intermittent asthma without complication (HCC)  Asthma - maintenance. Currently asthma is under good control.  She is on dulera daily   Albuterol prn.   Patient will call if any symptoms worsen.  Patient understands and agrees to the above plan.      11. Well adult exam  - -Discussed diet and exercise, counseled on vaccine and screening guidelines.       12. Tinea pedis of right foot  Started on triamcinolone for 1 week, then hold. See if this helps with symptoms or may try antifungal. Advised to not use steroid longer than 1 week at a time.   Advised on foot hygiene and care.       No orders of the defined types were placed in this encounter.      Meds & Refills for this Visit:  Requested Prescriptions     Signed Prescriptions Disp Refills    triamcinolone 0.1 % External Cream 60 g 0     Sig: Apply topically 2 (two) times daily for 7 days. Do not use longer than 7 days in a row. Use on the base of your right foot.       Imaging & Consults:  [facility] SUBHASH 2D+3D SCREENING BILAT (CPT=77067/10907)      No follow-ups on file.    There are no Patient Instructions on file for this visit.    All questions were answered and the patient understands the plan.

## 2024-08-17 ENCOUNTER — LAB ENCOUNTER (OUTPATIENT)
Dept: LAB | Age: 59
End: 2024-08-17
Attending: FAMILY MEDICINE
Payer: COMMERCIAL

## 2024-08-17 DIAGNOSIS — E78.5 DYSLIPIDEMIA: ICD-10-CM

## 2024-08-17 DIAGNOSIS — I10 PRIMARY HYPERTENSION: ICD-10-CM

## 2024-08-17 DIAGNOSIS — E03.9 ACQUIRED HYPOTHYROIDISM: ICD-10-CM

## 2024-08-17 DIAGNOSIS — R73.03 PREDIABETES: ICD-10-CM

## 2024-08-17 LAB
ALBUMIN SERPL-MCNC: 4.6 G/DL (ref 3.2–4.8)
ALBUMIN/GLOB SERPL: 1.8 {RATIO} (ref 1–2)
ALP LIVER SERPL-CCNC: 116 U/L
ALT SERPL-CCNC: 18 U/L
ANION GAP SERPL CALC-SCNC: 7 MMOL/L (ref 0–18)
AST SERPL-CCNC: 17 U/L (ref ?–34)
BASOPHILS # BLD AUTO: 0.07 X10(3) UL (ref 0–0.2)
BASOPHILS NFR BLD AUTO: 1.1 %
BILIRUB SERPL-MCNC: 0.4 MG/DL (ref 0.3–1.2)
BUN BLD-MCNC: 15 MG/DL (ref 9–23)
CALCIUM BLD-MCNC: 9.8 MG/DL (ref 8.7–10.4)
CHLORIDE SERPL-SCNC: 108 MMOL/L (ref 98–112)
CHOLEST SERPL-MCNC: 215 MG/DL (ref ?–200)
CO2 SERPL-SCNC: 25 MMOL/L (ref 21–32)
CREAT BLD-MCNC: 0.95 MG/DL
EGFRCR SERPLBLD CKD-EPI 2021: 69 ML/MIN/1.73M2 (ref 60–?)
EOSINOPHIL # BLD AUTO: 0.28 X10(3) UL (ref 0–0.7)
EOSINOPHIL NFR BLD AUTO: 4.3 %
ERYTHROCYTE [DISTWIDTH] IN BLOOD BY AUTOMATED COUNT: 14.7 %
EST. AVERAGE GLUCOSE BLD GHB EST-MCNC: 120 MG/DL (ref 68–126)
FASTING PATIENT LIPID ANSWER: YES
FASTING STATUS PATIENT QL REPORTED: YES
GLOBULIN PLAS-MCNC: 2.5 G/DL (ref 2–3.5)
GLUCOSE BLD-MCNC: 111 MG/DL (ref 70–99)
HBA1C MFR BLD: 5.8 % (ref ?–5.7)
HCT VFR BLD AUTO: 43.5 %
HDLC SERPL-MCNC: 43 MG/DL (ref 40–59)
HGB BLD-MCNC: 14.5 G/DL
IMM GRANULOCYTES # BLD AUTO: 0.02 X10(3) UL (ref 0–1)
IMM GRANULOCYTES NFR BLD: 0.3 %
LDLC SERPL CALC-MCNC: 148 MG/DL (ref ?–100)
LYMPHOCYTES # BLD AUTO: 1.68 X10(3) UL (ref 1–4)
LYMPHOCYTES NFR BLD AUTO: 25.6 %
MCH RBC QN AUTO: 27.8 PG (ref 26–34)
MCHC RBC AUTO-ENTMCNC: 33.3 G/DL (ref 31–37)
MCV RBC AUTO: 83.3 FL
MONOCYTES # BLD AUTO: 0.41 X10(3) UL (ref 0.1–1)
MONOCYTES NFR BLD AUTO: 6.3 %
NEUTROPHILS # BLD AUTO: 4.09 X10 (3) UL (ref 1.5–7.7)
NEUTROPHILS # BLD AUTO: 4.09 X10(3) UL (ref 1.5–7.7)
NEUTROPHILS NFR BLD AUTO: 62.4 %
NONHDLC SERPL-MCNC: 172 MG/DL (ref ?–130)
OSMOLALITY SERPL CALC.SUM OF ELEC: 292 MOSM/KG (ref 275–295)
PLATELET # BLD AUTO: 311 10(3)UL (ref 150–450)
POTASSIUM SERPL-SCNC: 4.4 MMOL/L (ref 3.5–5.1)
PROT SERPL-MCNC: 7.1 G/DL (ref 5.7–8.2)
RBC # BLD AUTO: 5.22 X10(6)UL
SODIUM SERPL-SCNC: 140 MMOL/L (ref 136–145)
TRIGL SERPL-MCNC: 135 MG/DL (ref 30–149)
TSI SER-ACNC: 1.19 MIU/ML (ref 0.55–4.78)
VLDLC SERPL CALC-MCNC: 25 MG/DL (ref 0–30)
WBC # BLD AUTO: 6.6 X10(3) UL (ref 4–11)

## 2024-08-17 PROCEDURE — 84443 ASSAY THYROID STIM HORMONE: CPT

## 2024-08-17 PROCEDURE — 80053 COMPREHEN METABOLIC PANEL: CPT

## 2024-08-17 PROCEDURE — 83036 HEMOGLOBIN GLYCOSYLATED A1C: CPT

## 2024-08-17 PROCEDURE — 80061 LIPID PANEL: CPT

## 2024-08-17 PROCEDURE — 85025 COMPLETE CBC W/AUTO DIFF WBC: CPT

## 2024-08-23 DIAGNOSIS — E03.9 ACQUIRED HYPOTHYROIDISM: ICD-10-CM

## 2024-08-23 RX ORDER — BUPROPION HYDROCHLORIDE 150 MG/1
150 TABLET ORAL DAILY
Qty: 90 TABLET | Refills: 2 | Status: SHIPPED | OUTPATIENT
Start: 2024-08-23

## 2024-08-23 RX ORDER — LEVOTHYROXINE SODIUM 75 UG/1
TABLET ORAL
Qty: 90 TABLET | Refills: 2 | Status: SHIPPED | OUTPATIENT
Start: 2024-08-23

## 2024-08-23 NOTE — TELEPHONE ENCOUNTER
Requested Prescriptions     Pending Prescriptions Disp Refills    buPROPion  MG Oral Tablet 24 Hr 90 tablet 0     Sig: Take 1 tablet (150 mg total) by mouth daily.    levothyroxine 75 MCG Oral Tab 90 tablet 0     Sig: TAKE 1 TABLET BY MOUTH EVERY DAY BEFORE BREAKFAST       LOV: 7/2/24  RTC:   Last Relevant Labs: 8/17/24  Filled: 6/4/24 #90 with 0 refills    No future appointments.

## 2024-08-27 RX ORDER — AMLODIPINE BESYLATE 5 MG/1
5 TABLET ORAL DAILY
Qty: 90 TABLET | Refills: 1 | Status: SHIPPED | OUTPATIENT
Start: 2024-08-27

## 2024-08-27 NOTE — TELEPHONE ENCOUNTER
Recent Visits  Date Type Provider Dept   07/02/24 Office Visit Edwina Agudelo, DO [facility] [address]   10/09/23 Office Visit Edwina Agudelo, DO [facility] [address]   05/04/23 Office Visit Edwina Agudelo,  [facility] [address]   Showing recent visits within past 540 days with a meds authorizing provider and meeting all other requirements  Future Appointments  No visits were found meeting these conditions.  Showing future appointments within next 150 days with a meds authorizing provider and meeting all other requirements.fill\  Requested Prescriptions     Pending Prescriptions Disp Refills    amLODIPine 5 MG Oral Tab 90 tablet 0     Sig: Take 1 tablet (5 mg total) by mouth daily.       Filled: 6/4/24 #90 with 0 refills    No future appointments.

## 2024-09-07 ENCOUNTER — HOSPITAL ENCOUNTER (OUTPATIENT)
Dept: MAMMOGRAPHY | Age: 59
Discharge: HOME | End: 2024-09-07
Attending: FAMILY MEDICINE
Payer: COMMERCIAL

## 2024-09-07 DIAGNOSIS — Z12.31 BREAST CANCER SCREENING BY MAMMOGRAM: ICD-10-CM

## 2024-09-07 PROCEDURE — 77067 SCR MAMMO BI INCL CAD: CPT | Performed by: FAMILY MEDICINE

## 2024-09-07 PROCEDURE — 77063 BREAST TOMOSYNTHESIS BI: CPT | Performed by: FAMILY MEDICINE

## 2024-11-13 DIAGNOSIS — F41.9 ANXIETY: ICD-10-CM

## 2024-11-14 RX ORDER — ALPRAZOLAM 0.25 MG/1
0.25 TABLET ORAL DAILY PRN
Qty: 30 TABLET | Refills: 3 | Status: SHIPPED | OUTPATIENT
Start: 2024-11-14

## 2024-11-14 NOTE — TELEPHONE ENCOUNTER
Requesting Alprazolam 0.25mg  Last OV: 7/2/24 Physical  RTC: not noted  Last Rx'd 5/4/23 #30 with 0 refills    Future Appointments   Date Time Provider Department Center   11/16/2024  8:00 AM Edwina Agudelo DO [facility] [address] [facility] [address]           Controlled med:  Rx pended and routed for approval/denial

## 2025-02-26 ENCOUNTER — APPOINTMENT (OUTPATIENT)
Dept: URBAN - METROPOLITAN AREA CLINIC 315 | Age: 60
Setting detail: DERMATOLOGY
End: 2025-02-26

## 2025-02-26 DIAGNOSIS — L81.4 OTHER MELANIN HYPERPIGMENTATION: ICD-10-CM

## 2025-02-26 DIAGNOSIS — L57.0 ACTINIC KERATOSIS: ICD-10-CM

## 2025-02-26 DIAGNOSIS — L82.1 OTHER SEBORRHEIC KERATOSIS: ICD-10-CM

## 2025-02-26 PROCEDURE — 99202 OFFICE O/P NEW SF 15 MIN: CPT | Mod: 25

## 2025-02-26 PROCEDURE — OTHER MIPS QUALITY: OTHER

## 2025-02-26 PROCEDURE — OTHER LIQUID NITROGEN: OTHER

## 2025-02-26 PROCEDURE — OTHER COUNSELING: OTHER

## 2025-02-26 PROCEDURE — 17000 DESTRUCT PREMALG LESION: CPT

## 2025-02-26 ASSESSMENT — LOCATION SIMPLE DESCRIPTION DERM
LOCATION SIMPLE: ABDOMEN
LOCATION SIMPLE: SCALP
LOCATION SIMPLE: LEFT CHEEK
LOCATION SIMPLE: LEFT ZYGOMA

## 2025-02-26 ASSESSMENT — LOCATION DETAILED DESCRIPTION DERM
LOCATION DETAILED: LEFT SUPERIOR PARIETAL SCALP
LOCATION DETAILED: LEFT MID PREAURICULAR CHEEK
LOCATION DETAILED: LEFT CENTRAL MALAR CHEEK
LOCATION DETAILED: LEFT CENTRAL ZYGOMA
LOCATION DETAILED: RIGHT RIB CAGE

## 2025-02-26 ASSESSMENT — LOCATION ZONE DERM
LOCATION ZONE: SCALP
LOCATION ZONE: FACE
LOCATION ZONE: TRUNK

## 2025-04-29 RX ORDER — AMLODIPINE BESYLATE 5 MG/1
5 TABLET ORAL DAILY
Qty: 90 TABLET | Refills: 1 | Status: SHIPPED | OUTPATIENT
Start: 2025-04-29

## 2025-04-29 NOTE — TELEPHONE ENCOUNTER
Requesting Amlodipine 5mg  LOV: 11/16/24 Telemedicine  RTC: prn  Last Relevant Labs: 8/17/24  Filled: 8/27/24 #90 with 1 refills    No future appointments.    Hypertension Medications Protocol Lybzhl9604/29/2025 12:27 PM   Protocol Details   CMP or BMP in past 12 months    Last BP reading less than 140/90    In person appointment or virtual visit in the past 12 mos or appointment in next 3 mos    EGFRCR or GFRNAA > 50    Medication is active on med list     Rx sent to pharmacy per protocol

## 2025-05-04 DIAGNOSIS — F41.1 GAD (GENERALIZED ANXIETY DISORDER): ICD-10-CM

## 2025-05-06 NOTE — TELEPHONE ENCOUNTER
Requesting Sertraline 50mg   LOV: 11/16/24 Telemedicine  RTC: 7 months  Last Relevant Labs: 8/17/24  Filled: 11/16/24 #180 with 1 refills    No future appointments.    Psychiatric Non-Scheduled (Anti-Anxiety) Cfvkor0305/06/2025 11:47 AM   Protocol Details   In person appointment or virtual visit in the past 6 mos or appointment in next 3 mos    Depression Screening completed within the past 12 months    Medication is active on med list     Rx sent to pharmacy per protocol

## 2025-05-20 DIAGNOSIS — E03.9 ACQUIRED HYPOTHYROIDISM: ICD-10-CM

## 2025-05-22 DIAGNOSIS — E03.9 ACQUIRED HYPOTHYROIDISM: ICD-10-CM

## 2025-05-22 RX ORDER — BUPROPION HYDROCHLORIDE 150 MG/1
150 TABLET ORAL DAILY
Qty: 90 TABLET | Refills: 0 | Status: SHIPPED | OUTPATIENT
Start: 2025-05-22

## 2025-05-22 RX ORDER — LEVOTHYROXINE SODIUM 75 UG/1
75 TABLET ORAL
Qty: 90 TABLET | Refills: 0 | Status: SHIPPED | OUTPATIENT
Start: 2025-05-22

## 2025-05-22 NOTE — TELEPHONE ENCOUNTER
Requesting Levothyroxine 75mcg  Filled: 8/23/24 #90 with 2 refills    Requesting Bupropion 150mg  Filled: 8/23/24 #90 with 2 refills    LOV: 11/16/24 Telemedicine  RTC: 7 months  Last Relevant Labs: 8/17/24    No future appointments.    Rx sent to pharmacy per protocol

## 2025-05-23 RX ORDER — BUPROPION HYDROCHLORIDE 150 MG/1
150 TABLET ORAL DAILY
Qty: 90 TABLET | Refills: 0 | OUTPATIENT
Start: 2025-05-23

## 2025-05-23 RX ORDER — LEVOTHYROXINE SODIUM 75 UG/1
75 TABLET ORAL
Qty: 90 TABLET | Refills: 0 | OUTPATIENT
Start: 2025-05-23

## 2025-08-18 DIAGNOSIS — E03.9 ACQUIRED HYPOTHYROIDISM: ICD-10-CM

## 2025-08-28 RX ORDER — BUPROPION HYDROCHLORIDE 150 MG/1
150 TABLET ORAL DAILY
Qty: 90 TABLET | Refills: 3 | OUTPATIENT
Start: 2025-08-28

## 2025-08-28 RX ORDER — LEVOTHYROXINE SODIUM 75 UG/1
75 TABLET ORAL
Qty: 90 TABLET | Refills: 3 | OUTPATIENT
Start: 2025-08-28

## (undated) DIAGNOSIS — J45.20 MILD INTERMITTENT ASTHMA WITHOUT COMPLICATION: ICD-10-CM

## (undated) DIAGNOSIS — E03.9 ACQUIRED HYPOTHYROIDISM: ICD-10-CM

## (undated) NOTE — LETTER
Date: 1/23/2018    Patient Name: Gearldean Sandhoff          To Whom it may concern: This letter has been written at the patient's request. The above patient was seen at the [facility] for treatment of a medical condition.     This patient kathie

## (undated) NOTE — LETTER
Date: 1/24/2018    Patient Name: Deborah Dos Santos          To Whom it may concern: This letter has been written at the patient's request. The above patient was seen at the [facility] for treatment of a medical condition.     This patient kathie

## (undated) NOTE — LETTER
Date: 12/9/2019    Patient Name: Yancy Cosme          To Whom it may concern: The above patient was seen at the [facility] for evaluation of a medical condition. This patient should be excused from attending work through 12/9/19.

## (undated) NOTE — LETTER
ASTHMA ACTION PLAN for Nicolas Enamorado     : 1965     Date: 2017  Provider:  Guille Wolf DO  Phone for doctor or clinic: [address] [facility], [address] [place], [place] [address]   [phone]

## (undated) NOTE — LETTER
ASTHMA ACTION PLAN for Yousuf Sanz     : 1965     Date: 2023  Provider:  Rehana Monahan DO  Phone for doctor or clinic: [facility], [address] [facility] , [address] [place] [address],[address]  [phone]           You can use the colors of a traffic light to help learn about your asthma medicines. 1. Green - Go! % of Personal Best Peak Flow Use controller medicine. Breathing is good  No cough or wheeze  Can work and play Medicine How much to take When to take it    Dulera 100/5 inhaler, 2 puffs twice daily         2. Yellow - Caution. 50-79% Personal Best Peak  Flow. Use reliever medicine to keep an asthma attack from getting bad. Cough  Wheezing  Tight Chest  Wake up at night Medicine How much to take When to take it    Albuterol inhaler,  2 puffs every four hours as needed. Additional instructions         3. Red - Stop! Danger!  <50% Personal Best Peak  Flow. Take these medications until  Get help from a doctor   Medicine not helping  Breathing is hard and fast  Nose opens wide  Can't walk  Ribs show  Can't talk well Medicine How much to take When to take it    Call 911 or got to the nearest ER. Call your doctor. Additional Instructions If your symptoms do not improve and you cannot contact your doctor, [facility] or call 911 immediately! [x] Asthma Action Plan reviewed with patient (and caregiver if necessary) and a copy of the plan was given to the patient/caregiver. [] Asthma Action Plan reviewed with patient (and caregiver if necessary) on the phone and mailed copy to patient or submitted via [address]      Signatures:  Provider  Rehana Monahan DO   Patient Caretaker

## (undated) NOTE — LETTER
ASTHMA ACTION PLAN for Rosario Smith     : 1965     Date: 2024  Provider:  Edwina Agudelo DO  Phone for doctor or clinic: [facility], [address] [place] [address]  [facility] [address] [phone]    ACT Score: 25      You can use the colors of a traffic light to help learn about your asthma medicines.      1. Green - Go! % of Personal Best Peak Flow Use controller medicine.   Breathing is good  No cough or wheeze  Can work and play Medicine How much to take When to take it    Take Dulera one puff two times a day       2. Yellow - Caution. 50-79% Personal Best Peak  Flow.  Use reliever medicine to keep an asthma attack from getting bad.   Cough  Wheezing  Tight Chest  Wake up at night Medicine How much to take When to take it    Albuterol inhaler,  2 puffs every four hours as needed.         Additional instructions         3. Red - Stop! Danger!  <50% Personal Best Peak  Flow. Take these medications until  Get help from a doctor   Medicine not helping  Breathing is hard and fast  Nose opens wide  Can't walk  Ribs show  Can't talk well Medicine How much to take When to take it    Take albuterol 2 puffs every 15 minutes and go to the ER or call 911       Additional Instructions If your symptoms do not improve and you cannot contact your doctor, [facility] or call 911 immediately!     [x] Asthma Action Plan reviewed with patient (and caregiver if necessary) and a copy of the plan was given to the patient/caregiver.   [] Asthma Action Plan reviewed with patient (and caregiver if necessary) on the phone and mailed copy to patient or submitted via [employer].     Signatures:  Provider  Edwina Agudelo DO   Patient Caretaker

## (undated) NOTE — LETTER
ASTHMA ACTION PLAN for Bozena Fox     : 1965     Date: 2022  Provider:  Graciela Brown DO  Phone for doctor or clinic: [facility], [address] [address] [facility] [address]  [facility] [address] [phone]    ACT Score: 22      You can use the colors of a traffic light to help learn about your asthma medicines. 1. Green - Go! % of Personal Best Peak Flow Use controller medicine. Breathing is good  No cough or wheeze  Can work and play Medicine How much to take When to take it    Dulera, 2 puffs twice a day           2. Yellow - Caution. 50-79% Personal Best Peak  Flow. Use reliever medicine to keep an asthma attack from getting bad. Cough  Wheezing  Tight Chest  Wake up at night Medicine How much to take When to take it    Albuterol inhaler,  2 puffs every four hours as needed. Additional instructions         3. Red - Stop! Danger!  <50% Personal Best Peak  Flow. Take these medications until  Get help from a doctor   Medicine not helping  Breathing is hard and fast  Nose opens wide  Can't walk  Ribs show  Can't talk well Medicine How much to take When to take it    Call 911 or go to the nearest ER. Call your doctor. Additional Instructions If your symptoms do not improve and you cannot contact your doctor, [facility] or call 911 immediately! [x] Asthma Action Plan reviewed with patient (and caregiver if necessary) and a copy of the plan was given to the patient/caregiver. [] Asthma Action Plan reviewed with patient (and caregiver if necessary) on the phone and mailed copy to patient or submitted via [address]      Signatures:  Provider  Graciela Brown DO   Patient Caretaker

## (undated) NOTE — LETTER
ASTHMA ACTION PLAN for Rosario Smith     : 1965     Date: 2024  Provider:  Edwina Agudelo DO  Phone for doctor or clinic: [facility], [address] [place] [address]  [facility] [address] [phone]    ACT Score: 25      You can use the colors of a traffic light to help learn about your asthma medicines.      1. Green - Go! % of Personal Best Peak Flow Use controller medicine.   Breathing is good  No cough or wheeze  Can work and play Medicine How much to take When to take it          2. Yellow - Caution. 50-79% Personal Best Peak  Flow.  Use reliever medicine to keep an asthma attack from getting bad.   Cough  Wheezing  Tight Chest  Wake up at night Medicine How much to take When to take it           Additional instructions         3. Red - Stop! Danger!  <50% Personal Best Peak  Flow. Take these medications until  Get help from a doctor   Medicine not helping  Breathing is hard and fast  Nose opens wide  Can't walk  Ribs show  Can't talk well Medicine How much to take When to take it         Additional Instructions If your symptoms do not improve and you cannot contact your doctor, [facility] or call 911 immediately!     [x] Asthma Action Plan reviewed with patient (and caregiver if necessary) and a copy of the plan was given to the patient/caregiver.   [] Asthma Action Plan reviewed with patient (and caregiver if necessary) on the phone and mailed copy to patient or submitted via [employer].     Signatures:  Provider  Edwina Agudelo DO   Patient Caretaker

## (undated) NOTE — LETTER
07/16/19        Sigrid Jackson  [address] [place] [address]      Viet ,    This is just a reminder that you are due for a Mammogram. There is an order in the system for you.  You can schedule this through [facility] at [phone] a

## (undated) NOTE — LETTER
ASTHMA ACTION PLAN for Gurpreet Vang     : 1965     Date: 3/6/2020  Provider:  Jeremy Carmichael DO  Phone for doctor or clinic: [facility], [address] [place] [address] , [address] [facility]  [phone]

## (undated) NOTE — LETTER
ASTHMA ACTION PLAN for Jing Cotto     : 1965     Date: 3/16/2020  Provider:  Dominga Valdez DO  Phone for doctor or clinic: [address] [facility], [address] [place] [address] , [address] [facility]  [phone]

## (undated) NOTE — LETTER
02/14/19        Anushka Langford  [address] [facility] [address]      Dear Dai Mackenzie,    [facility] records indicate that you have outstanding lab work and or testing that was ordered for you and has not yet been completed:  Orders Placed This Encounter

## (undated) NOTE — LETTER
ASTHMA ACTION PLAN for Yandy Johnson     : 1965     Date: 1/15/2019  Provider:  Valente Kay PA-C  Phone for doctor or clinic: [facility], [address] [place] [address] , [address] [address] [facility]  [phone]

## (undated) NOTE — MR AVS SNAPSHOT
[facility] [address] [place] [address] [address] [facility]  [address] [id number] [phone]               Thank you for choosing us for your health care visit with Jamar Quinones DO.   We are glad to serve you and happy to provide you wi season. Try not to touch your face. · As much as possible, stay away from infected people. · Follow these standbys for staying healthy: Eat balanced meals, exercise regularly, and get plenty of sleep.   Stay away from allergens  First find out what things moist and clean by a lining of mucosa. Things such as pollen, smoke, and chemical fumes can irritate the mucosa. It can then swell up. As a response to irritation, the mucosa makes more mucus and other fluids. Tiny hairlike cilia cover the mucosa.  Cilia he You have an infection of the middle ear, the space behind the eardrum. This is also called acute otitis media (AOM). Sometimes it is caused by the common cold.  This is because congestion can block the internal passage (eustachian tube) that drains fluid fr Return in about 2 weeks (around 2/6/2017) for follow up if needed.       Allergies as of Jan 23, 2017     Augmentin [Amoxicillin-Pot Clavulanate]     \"Funny feeling in throat\"      Cat Dander [Dander]     Dilaudid [Hydromorphone Hcl]     Food     Kiwi Fr - Ciprofloxacin HCl 500 MG Tabs  - fluconazole 150 MG Tabs  - Neomycin-Polymyxin-HC [id number] Soln            Follow-up Instructions     Return in about 2 weeks (around 2/6/2017) for follow up if needed.          MyChart     Sign up for [facility], your sec

## (undated) NOTE — MR AVS SNAPSHOT
[facility] [address] [place] [address] [address] [facility]  Prabha Darby [phone] [id number] [phone]               Thank you for choosing us for your health care visit with Jag Porter MD.  We are glad to serve you and happy to provide you wit TAKE ONE TABLET BY MOUTH ONCE DAILY   Commonly known as:  ZOLOFT                   MyChart     Sign up for [employer], your secure online medical record.   [employer] will allow you to access patient instructions from your recent visit,  view other health informa

## (undated) NOTE — LETTER
[facility], [address] [facility] , [address]  [facility] [address]  [phone]            July 23, 2018      [place] [id number]      Dear Ms. Leatha Esteves,     It has come